# Patient Record
Sex: FEMALE | Race: WHITE | Employment: OTHER | URBAN - METROPOLITAN AREA
[De-identification: names, ages, dates, MRNs, and addresses within clinical notes are randomized per-mention and may not be internally consistent; named-entity substitution may affect disease eponyms.]

---

## 2017-09-15 ENCOUNTER — HOSPITAL ENCOUNTER (EMERGENCY)
Age: 77
Discharge: HOME | End: 2017-09-15
Attending: EMERGENCY MEDICINE
Payer: MEDICARE

## 2017-09-15 VITALS
HEART RATE: 89 BPM | OXYGEN SATURATION: 98 % | BODY MASS INDEX: 28.05 KG/M2 | RESPIRATION RATE: 18 BRPM | DIASTOLIC BLOOD PRESSURE: 69 MMHG | TEMPERATURE: 98.2 F | WEIGHT: 158.29 LBS | SYSTOLIC BLOOD PRESSURE: 131 MMHG | HEIGHT: 63 IN

## 2017-09-15 DIAGNOSIS — Z23 NEED FOR TETANUS BOOSTER: ICD-10-CM

## 2017-09-15 DIAGNOSIS — S05.01XA CORNEAL ABRASION, RIGHT, INITIAL ENCOUNTER: Primary | ICD-10-CM

## 2017-09-15 PROCEDURE — 74011250637 HC RX REV CODE- 250/637: Performed by: PHYSICIAN ASSISTANT

## 2017-09-15 PROCEDURE — 90471 IMMUNIZATION ADMIN: CPT

## 2017-09-15 PROCEDURE — 74011250636 HC RX REV CODE- 250/636: Performed by: PHYSICIAN ASSISTANT

## 2017-09-15 PROCEDURE — 74011000250 HC RX REV CODE- 250: Performed by: PHYSICIAN ASSISTANT

## 2017-09-15 PROCEDURE — 90715 TDAP VACCINE 7 YRS/> IM: CPT | Performed by: PHYSICIAN ASSISTANT

## 2017-09-15 PROCEDURE — 99283 EMERGENCY DEPT VISIT LOW MDM: CPT

## 2017-09-15 RX ORDER — OXYCODONE HYDROCHLORIDE 5 MG/1
5 TABLET ORAL
Status: COMPLETED | OUTPATIENT
Start: 2017-09-15 | End: 2017-09-15

## 2017-09-15 RX ORDER — ERYTHROMYCIN 5 MG/G
OINTMENT OPHTHALMIC
Qty: 3.5 G | Refills: 0 | Status: SHIPPED | OUTPATIENT
Start: 2017-09-15 | End: 2017-09-22

## 2017-09-15 RX ORDER — ERYTHROMYCIN 5 MG/G
OINTMENT OPHTHALMIC
Status: COMPLETED | OUTPATIENT
Start: 2017-09-15 | End: 2017-09-15

## 2017-09-15 RX ORDER — HYDROCODONE BITARTRATE AND ACETAMINOPHEN 5; 325 MG/1; MG/1
1 TABLET ORAL
Status: DISCONTINUED | OUTPATIENT
Start: 2017-09-15 | End: 2017-09-15

## 2017-09-15 RX ORDER — OXYCODONE HYDROCHLORIDE 5 MG/1
2.5-5 TABLET ORAL
Qty: 12 TAB | Refills: 0 | Status: SHIPPED | OUTPATIENT
Start: 2017-09-15 | End: 2018-03-05

## 2017-09-15 RX ORDER — NEBIVOLOL 5 MG/1
2.5 TABLET ORAL 2 TIMES DAILY
COMMUNITY

## 2017-09-15 RX ORDER — BUTALBITAL, ASPIRIN, AND CAFFEINE 325; 50; 40 MG/1; MG/1; MG/1
1 CAPSULE ORAL
Qty: 20 CAP | Refills: 0 | Status: SHIPPED | OUTPATIENT
Start: 2017-09-15 | End: 2017-12-14

## 2017-09-15 RX ORDER — TETRACAINE HYDROCHLORIDE 5 MG/ML
1 SOLUTION OPHTHALMIC
Status: COMPLETED | OUTPATIENT
Start: 2017-09-15 | End: 2017-09-15

## 2017-09-15 RX ADMIN — TETANUS TOXOID, REDUCED DIPHTHERIA TOXOID AND ACELLULAR PERTUSSIS VACCINE, ADSORBED 0.5 ML: 5; 2.5; 8; 8; 2.5 SUSPENSION INTRAMUSCULAR at 22:17

## 2017-09-15 RX ADMIN — TETRACAINE HYDROCHLORIDE 1 DROP: 5 SOLUTION OPHTHALMIC at 22:06

## 2017-09-15 RX ADMIN — ERYTHROMYCIN: 5 OINTMENT OPHTHALMIC at 22:17

## 2017-09-15 RX ADMIN — FLUORESCEIN SODIUM 1 STRIP: 1 STRIP OPHTHALMIC at 22:06

## 2017-09-15 RX ADMIN — OXYCODONE HYDROCHLORIDE 5 MG: 5 TABLET ORAL at 22:06
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
## 2017-09-16 NOTE — DISCHARGE INSTRUCTIONS
Corneal Scratches: Care Instructions  Your Care Instructions    The cornea is the clear surface that covers the front of the eye. When a speck of dirt, a wood chip, an insect, or another object flies into your eye, it can cause a painful scratch on the cornea. Wearing contact lenses too long or rubbing your eyes can also scratch the cornea. Small scratches usually heal in a day or two. Deeper scratches may take longer. If you have had a foreign object removed from your eye or you have a corneal scratch, you will need to watch for infection and vision problems while your eye heals. Follow-up care is a key part of your treatment and safety. Be sure to make and go to all appointments, and call your doctor if you are having problems. It's also a good idea to know your test results and keep a list of the medicines you take. How can you care for yourself at home? · The doctor probably used a medicine during your exam to numb your eye. When it wears off in 30 to 60 minutes, your eye pain may come back. Take pain medicines exactly as directed. ¨ If the doctor gave you a prescription medicine for pain, take it as prescribed. ¨ If you are not taking a prescription pain medicine, ask your doctor if you can take an over-the-counter medicine. ¨ Do not take two or more pain medicines at the same time unless the doctor told you to. Many pain medicines have acetaminophen, which is Tylenol. Too much acetaminophen (Tylenol) can be harmful. · Do not rub your injured eye. Rubbing can make it worse. · Use the prescribed eyedrops or ointment as directed. Be sure the dropper or bottle tip is clean. To put in eyedrops or ointment:  ¨ Tilt your head back, and pull your lower eyelid down with one finger. ¨ Drop or squirt the medicine inside the lower lid. ¨ Close your eye for 30 to 60 seconds to let the drops or ointment move around. ¨ Do not touch the ointment or dropper tip to your eyelashes or any other surface.   · Do not use your contact lens in your hurt eye until your doctor says you can. Also, do not wear eye makeup until your eye has healed. · Do not drive if you have blurred vision. · Bright light may hurt. Sunglasses can help. · To prevent eye injuries in the future, wear safety glasses or goggles when you work with machines or tools, mow the lawn, or ride a bike or motorcycle. When should you call for help? Call your doctor now or seek immediate medical care if:  · You have signs of an eye infection, such as:  ¨ Pus or thick discharge coming from the eye. ¨ Redness or swelling around the eye. ¨ A fever. · You have new or worse eye pain. · You have vision changes. · It feels like there is something in your eye. · Light hurts your eye. Watch closely for changes in your health, and be sure to contact your doctor if:  · You do not get better as expected. Where can you learn more? Go to http://victoriano-meka.info/. Enter Q213 in the search box to learn more about \"Corneal Scratches: Care Instructions. \"  Current as of: March 20, 2017  Content Version: 11.3  © 1915-0716 [employer]. Care instructions adapted under license by [employer] (which disclaims liability or warranty for this information). If you have questions about a medical condition or this instruction, always ask your healthcare professional. [place] [address] any warranty or liability for your use of this information. DTaP (Diphtheria, Tetanus, Pertussis) Vaccine: What You Need to Know  Why get vaccinated? Diphtheria, tetanus, and pertussis are serious diseases caused by bacteria. Diphtheria and pertussis are spread from person to person. Tetanus enters the body through cuts or wounds. DIPHTHERIA causes a thick covering in the back of the throat. · It can lead to breathing problems, paralysis, heart failure, and even death.   TETANUS (Lockjaw) causes painful tightening of the muscles, usually all over the body. · It can lead to \"locking\" of the jaw so the victim cannot open his mouth or swallow. Tetanus leads to death in up to 2 out of 10 cases. PERTUSSIS (Whooping Cough) causes coughing spells so bad that it is hard for infants to eat, drink, or breathe. These spells can last for weeks. · It can lead to pneumonia, seizures (jerking and staring spells), brain damage, and death. Diphtheria, tetanus, and pertussis vaccine (DTaP) can help prevent these diseases. Most children who are vaccinated with DTaP will be protected throughout childhood. Many more children would get these diseases if we stopped vaccinating. DTaP is a safer version of an older vaccine called DTP. DTP is no longer used in the [place]. Who should get DTaP vaccine and when? Children should get 5 doses of DTaP vaccine, one dose at each of the following ages:  · 2 months  · 4 months  · 6 months  · 15-18 months  · 4-6 years  DTaP may be given at the same time as other vaccines. Some children should not get DTaP vaccine or should wait. · Children with minor illnesses, such as a cold, may be vaccinated. But children who are moderately or severely ill should usually wait until they recover before getting DTaP vaccine. · Any child who had a life-threatening allergic reaction after a dose of DTaP should not get another dose. · Any child who suffered a brain or nervous system disease within 7 days after a dose of DTaP should not get another dose. · Talk with your doctor if your child:  Manuel Mora Had a seizure or collapsed after a dose of DTaP. ¨ Cried non-stop for 3 hours or more after a dose of DTaP. ¨ Had a fever over 105°F after a dose of DTaP. Ask your doctor for more information. Some of these children should not get another dose of pertussis vaccine, but may get a vaccine without pertussis, called DT. Older children and adults  DTaP is not licensed for adolescents, adults, or children 9years of age and older.   But older people still need protection. A vaccine called Tdap is similar to DTaP. A single dose of Tdap is recommended for people 11 through 59years of age. Another vaccine, called Td, protects against tetanus and diphtheria, but not pertussis. It is recommended every 10 years. There are separate Vaccine Information Statements for these vaccines. What are the risks from DTaP vaccine? Getting diphtheria, tetanus, or pertussis disease is much riskier than getting DTaP vaccine. However, a vaccine, like any medicine, is capable of causing serious problems, such as severe allergic reactions. The risk of DTaP vaccine causing serious harm, or death, is extremely small. Mild Problems (Common)  · Fever (up to about 1 child in 4)  · Redness or swelling where the shot was given (up to about 1 child in 4)  · Soreness or tenderness where the shot was given (up to about 1 child in 4)  These problems occur more often after the 4th and 5th doses of the DTaP series than after earlier doses. Sometimes the 4th or 5th dose of DTaP vaccine is followed by swelling of the entire arm or leg in which the shot was given, lasting 1-7 days (up to about 1 child in 27). Other mild problems include:  · Fussiness (up to about 1 child in 3)  · Tiredness or poor appetite (up to about 1 child in 10)  · Vomiting (up to about 1 child in 48)  These problems generally occur 1-3 days after the shot. Moderate Problems (Uncommon)  · Seizure (jerking or staring) (about 1 child out of 14,000)  · Non-stop crying, for 3 hours or more (up to about 1 child out of 1,000)  · High fever, over 105°F (about 1 child out of 16,000)  Severe Problems (Very Rare)  · Serious allergic reaction (less than 1 out of a million doses)  · Several other severe problems have been reported after DTaP vaccine. These include:  ¨ Long-term seizures, coma, or lowered consciousness. ¨ Permanent brain damage.   These are so rare it is hard to tell if they are caused by the vaccine. Controlling fever is especially important for children who have had seizures, for any reason. It is also important if another family member has had seizures. You can reduce fever and pain by giving your child an aspirin-free pain reliever when the shot is given, and for the next 24 hours, following the package instructions. What if there is a serious reaction? What should I look for? · Look for anything that concerns you, such as signs of a severe allergic reaction, very high fever, or behavior changes. Signs of a severe allergic reaction can include hives, swelling of the face and throat, difficulty breathing, a fast heartbeat, dizziness, and weakness. These would start a few minutes to a few hours after the vaccination. What should I do? · If you think it is a severe allergic reaction or other emergency that can't wait, call 9-1-1 or get the person to the nearest hospital. Otherwise, call your doctor. · Afterward, the reaction should be reported to the Vaccine Adverse Event Reporting System (VAERS). Your doctor might file this report, or you can do it yourself through the VAERS web site at www.vaers. hhs.gov, or by calling 6-790.209.1765. VAERS is only for reporting reactions. They do not give medical advice. The National Vaccine Injury Compensation Program  The National Vaccine Injury Compensation Program (VICP) is a federal program that was created to compensate people who may have been injured by certain vaccines. Persons who believe they may have been injured by a vaccine can learn about the program and about filing a claim by calling 9-450.453.8146 or visiting the [address] website at www.[facility] Services[facility].gov/vaccinecompensation. How can I learn more? · Ask your doctor. · Call your local or state health department.   · Contact the Centers for Disease Control and Prevention (CDC):  ¨ Call 4-977.907.6471 (1-800-CDC-INFO) or  ¨ Visit CDC's website at www.cdc.gov/vaccines  Vaccine Information Statement  DTaP (Tetanus, Diphtheria, Pertussis ) Vaccine  (5/17/2007)  [address] [id number]  Department of Health and Human Services  Centers for Disease Control and Prevention  Many Vaccine Information Statements are available in [place] and other languages. See www.immunize.org/vis. Muchas hojas de información sobre vacunas están disponibles en español y en otros idiomas. Visite www.immunize.org/vis. Care instructions adapted under license by [employer] (which disclaims liability or warranty for this information). If you have questions about a medical condition or this instruction, always ask your healthcare professional. [address] any warranty or liability for your use of this information.

## 2017-09-16 NOTE — ED NOTES
Patient stated that she had suddenly felt sharp pain in Right eye around 1600. Patient stated that she is in intense pain, especially when she attempts to close her eyes. Patient stated that she feels like there is something in her eyes. Patient stated that her daughter saw a \"bubble\" in her eye. Patient stated that she went to [facility] and that they told her that they \"dont do eyes. \"

## 2017-09-16 NOTE — ED NOTES
Patient discharge instructions reviewed with patient by MARCELLO Martinez. Patient denied questions. Patient ambulated off unit without assistance.

## 2018-03-05 ENCOUNTER — ANESTHESIA EVENT (OUTPATIENT)
Dept: SURGERY | Age: 78
End: 2018-03-05
Payer: MEDICARE

## 2018-03-05 ENCOUNTER — HOSPITAL ENCOUNTER (OUTPATIENT)
Age: 78
Setting detail: OBSERVATION
Discharge: HOME | End: 2018-03-06
Attending: EMERGENCY MEDICINE | Admitting: SURGERY
Payer: MEDICARE

## 2018-03-05 ENCOUNTER — ANESTHESIA (OUTPATIENT)
Dept: SURGERY | Age: 78
End: 2018-03-05
Payer: MEDICARE

## 2018-03-05 ENCOUNTER — APPOINTMENT (OUTPATIENT)
Dept: ULTRASOUND IMAGING | Age: 78
End: 2018-03-05
Attending: EMERGENCY MEDICINE
Payer: MEDICARE

## 2018-03-05 ENCOUNTER — APPOINTMENT (OUTPATIENT)
Dept: GENERAL RADIOLOGY | Age: 78
End: 2018-03-05
Attending: EMERGENCY MEDICINE
Payer: MEDICARE

## 2018-03-05 DIAGNOSIS — K80.00 ACUTE CALCULOUS CHOLECYSTITIS: ICD-10-CM

## 2018-03-05 DIAGNOSIS — K81.0 ACUTE CHOLECYSTITIS: Primary | ICD-10-CM

## 2018-03-05 PROBLEM — Z90.49 S/P CHOLECYSTECTOMY: Status: ACTIVE | Noted: 2018-03-05

## 2018-03-05 LAB
ALBUMIN SERPL-MCNC: 4 G/DL (ref 3.5–5)
ALBUMIN/GLOB SERPL: 1 {RATIO} (ref 1.1–2.2)
ALP SERPL-CCNC: 104 U/L (ref 45–117)
ALT SERPL-CCNC: 16 U/L (ref 12–78)
ANION GAP SERPL CALC-SCNC: 7 MMOL/L (ref 5–15)
APPEARANCE UR: CLEAR
AST SERPL-CCNC: 16 U/L (ref 15–37)
ATRIAL RATE: 61 BPM
BACTERIA URNS QL MICRO: NEGATIVE /HPF
BASOPHILS # BLD: 0 K/UL (ref 0–0.1)
BASOPHILS NFR BLD: 0 % (ref 0–1)
BILIRUB SERPL-MCNC: 0.5 MG/DL (ref 0.2–1)
BILIRUB UR QL: NEGATIVE
BUN SERPL-MCNC: 17 MG/DL (ref 6–20)
BUN/CREAT SERPL: 22 (ref 12–20)
CALCIUM SERPL-MCNC: 9 MG/DL (ref 8.5–10.1)
CALCULATED P AXIS, ECG09: 5 DEGREES
CALCULATED R AXIS, ECG10: -22 DEGREES
CALCULATED T AXIS, ECG11: -7 DEGREES
CHLORIDE SERPL-SCNC: 102 MMOL/L (ref 97–108)
CK MB CFR SERPL CALC: NORMAL % (ref 0–2.5)
CK MB SERPL-MCNC: <1 NG/ML (ref 5–25)
CK SERPL-CCNC: 53 U/L (ref 26–192)
CO2 SERPL-SCNC: 26 MMOL/L (ref 21–32)
COLOR UR: NORMAL
CREAT SERPL-MCNC: 0.79 MG/DL (ref 0.55–1.02)
DIAGNOSIS, 93000: NORMAL
DIFFERENTIAL METHOD BLD: ABNORMAL
EOSINOPHIL # BLD: 0.1 K/UL (ref 0–0.4)
EOSINOPHIL NFR BLD: 1 % (ref 0–7)
EPITH CASTS URNS QL MICRO: NORMAL /LPF
ERYTHROCYTE [DISTWIDTH] IN BLOOD BY AUTOMATED COUNT: 12.8 % (ref 11.5–14.5)
GLOBULIN SER CALC-MCNC: 3.9 G/DL (ref 2–4)
GLUCOSE SERPL-MCNC: 114 MG/DL (ref 65–100)
GLUCOSE UR STRIP.AUTO-MCNC: NEGATIVE MG/DL
HCT VFR BLD AUTO: 40.1 % (ref 35–47)
HGB BLD-MCNC: 13.5 G/DL (ref 11.5–16)
HGB UR QL STRIP: NEGATIVE
HYALINE CASTS URNS QL MICRO: NORMAL /LPF (ref 0–5)
IMM GRANULOCYTES # BLD: 0.1 K/UL (ref 0–0.04)
IMM GRANULOCYTES NFR BLD AUTO: 1 % (ref 0–0.5)
KETONES UR QL STRIP.AUTO: NEGATIVE MG/DL
LEUKOCYTE ESTERASE UR QL STRIP.AUTO: NEGATIVE
LIPASE SERPL-CCNC: 137 U/L (ref 73–393)
LYMPHOCYTES # BLD: 1.6 K/UL (ref 0.8–3.5)
LYMPHOCYTES NFR BLD: 13 % (ref 12–49)
MCH RBC QN AUTO: 31 PG (ref 26–34)
MCHC RBC AUTO-ENTMCNC: 33.7 G/DL (ref 30–36.5)
MCV RBC AUTO: 92 FL (ref 80–99)
MONOCYTES # BLD: 0.5 K/UL (ref 0–1)
MONOCYTES NFR BLD: 4 % (ref 5–13)
NEUTS SEG # BLD: 10.1 K/UL (ref 1.8–8)
NEUTS SEG NFR BLD: 81 % (ref 32–75)
NITRITE UR QL STRIP.AUTO: NEGATIVE
NRBC # BLD: 0 K/UL (ref 0–0.01)
NRBC BLD-RTO: 0 PER 100 WBC
P-R INTERVAL, ECG05: 154 MS
PH UR STRIP: 6.5 [PH] (ref 5–8)
PLATELET # BLD AUTO: 313 K/UL (ref 150–400)
PMV BLD AUTO: 9.9 FL (ref 8.9–12.9)
POTASSIUM SERPL-SCNC: 3.8 MMOL/L (ref 3.5–5.1)
PROT SERPL-MCNC: 7.9 G/DL (ref 6.4–8.2)
PROT UR STRIP-MCNC: NEGATIVE MG/DL
Q-T INTERVAL, ECG07: 404 MS
QRS DURATION, ECG06: 72 MS
QTC CALCULATION (BEZET), ECG08: 406 MS
RBC # BLD AUTO: 4.36 M/UL (ref 3.8–5.2)
RBC #/AREA URNS HPF: NORMAL /HPF (ref 0–5)
SODIUM SERPL-SCNC: 135 MMOL/L (ref 136–145)
SP GR UR REFRACTOMETRY: 1.01 (ref 1–1.03)
TROPONIN I SERPL-MCNC: <0.04 NG/ML
UA: UC IF INDICATED,UAUC: NORMAL
UROBILINOGEN UR QL STRIP.AUTO: 0.2 EU/DL (ref 0.2–1)
VENTRICULAR RATE, ECG03: 61 BPM
WBC # BLD AUTO: 12.4 K/UL (ref 3.6–11)
WBC URNS QL MICRO: NORMAL /HPF (ref 0–4)

## 2018-03-05 PROCEDURE — 77030008771 HC TU NG SALEM SUMP -A: Performed by: NURSE ANESTHETIST, CERTIFIED REGISTERED

## 2018-03-05 PROCEDURE — 93005 ELECTROCARDIOGRAM TRACING: CPT

## 2018-03-05 PROCEDURE — 77010033678 HC OXYGEN DAILY

## 2018-03-05 PROCEDURE — 71046 X-RAY EXAM CHEST 2 VIEWS: CPT

## 2018-03-05 PROCEDURE — 96375 TX/PRO/DX INJ NEW DRUG ADDON: CPT

## 2018-03-05 PROCEDURE — 77030031139 HC SUT VCRL2 J&J -A: Performed by: SURGERY

## 2018-03-05 PROCEDURE — 74011250636 HC RX REV CODE- 250/636

## 2018-03-05 PROCEDURE — 77030035220 HC TRCR ENDOSC BLNTPRT ANCHR COVD -B: Performed by: SURGERY

## 2018-03-05 PROCEDURE — 76210000035 HC AMBSU PH I REC 1 TO 1.5 HR: Performed by: SURGERY

## 2018-03-05 PROCEDURE — 74011250637 HC RX REV CODE- 250/637: Performed by: EMERGENCY MEDICINE

## 2018-03-05 PROCEDURE — 83690 ASSAY OF LIPASE: CPT | Performed by: EMERGENCY MEDICINE

## 2018-03-05 PROCEDURE — 74011000250 HC RX REV CODE- 250: Performed by: SURGERY

## 2018-03-05 PROCEDURE — 76705 ECHO EXAM OF ABDOMEN: CPT

## 2018-03-05 PROCEDURE — 77030019908 HC STETH ESOPH SIMS -A: Performed by: NURSE ANESTHETIST, CERTIFIED REGISTERED

## 2018-03-05 PROCEDURE — 81001 URINALYSIS AUTO W/SCOPE: CPT | Performed by: EMERGENCY MEDICINE

## 2018-03-05 PROCEDURE — 77030020053 HC ELECTRD LAPSCP COVD -B: Performed by: SURGERY

## 2018-03-05 PROCEDURE — 77030035048 HC TRCR ENDOSC OPTCL COVD -B: Performed by: SURGERY

## 2018-03-05 PROCEDURE — 99285 EMERGENCY DEPT VISIT HI MDM: CPT

## 2018-03-05 PROCEDURE — 74011000250 HC RX REV CODE- 250: Performed by: EMERGENCY MEDICINE

## 2018-03-05 PROCEDURE — 77030021678 HC GLIDESCP STAT DISP VERT -B: Performed by: NURSE ANESTHETIST, CERTIFIED REGISTERED

## 2018-03-05 PROCEDURE — 77030013079 HC BLNKT BAIR HGGR 3M -A: Performed by: NURSE ANESTHETIST, CERTIFIED REGISTERED

## 2018-03-05 PROCEDURE — 84484 ASSAY OF TROPONIN QUANT: CPT | Performed by: EMERGENCY MEDICINE

## 2018-03-05 PROCEDURE — 77030035051: Performed by: SURGERY

## 2018-03-05 PROCEDURE — 96365 THER/PROPH/DIAG IV INF INIT: CPT

## 2018-03-05 PROCEDURE — 74011250636 HC RX REV CODE- 250/636: Performed by: SURGERY

## 2018-03-05 PROCEDURE — 76030000000 HC AMB SURG OR TIME 0.5 TO 1: Performed by: SURGERY

## 2018-03-05 PROCEDURE — 77030010507 HC ADH SKN DERMBND J&J -B: Performed by: SURGERY

## 2018-03-05 PROCEDURE — 99218 HC RM OBSERVATION: CPT

## 2018-03-05 PROCEDURE — 77030021352 HC CBL LD SYS DISP COVD -B: Performed by: SURGERY

## 2018-03-05 PROCEDURE — 77030018836 HC SOL IRR NACL ICUM -A: Performed by: SURGERY

## 2018-03-05 PROCEDURE — 77030020255 HC SOL INJ LR 1000ML BG: Performed by: SURGERY

## 2018-03-05 PROCEDURE — 85025 COMPLETE CBC W/AUTO DIFF WBC: CPT | Performed by: EMERGENCY MEDICINE

## 2018-03-05 PROCEDURE — 74011250637 HC RX REV CODE- 250/637: Performed by: SURGERY

## 2018-03-05 PROCEDURE — 77030012022 HC APPL CLP ENDOSC COVD -C: Performed by: SURGERY

## 2018-03-05 PROCEDURE — 80053 COMPREHEN METABOLIC PANEL: CPT | Performed by: EMERGENCY MEDICINE

## 2018-03-05 PROCEDURE — 77030026438 HC STYL ET INTUB CARD -A: Performed by: NURSE ANESTHETIST, CERTIFIED REGISTERED

## 2018-03-05 PROCEDURE — 77030002933 HC SUT MCRYL J&J -A: Performed by: SURGERY

## 2018-03-05 PROCEDURE — 74011250636 HC RX REV CODE- 250/636: Performed by: EMERGENCY MEDICINE

## 2018-03-05 PROCEDURE — 74011000250 HC RX REV CODE- 250

## 2018-03-05 PROCEDURE — 77030011640 HC PAD GRND REM COVD -A: Performed by: SURGERY

## 2018-03-05 PROCEDURE — 77030009852 HC PCH RTVR ENDOSC COVD -B: Performed by: SURGERY

## 2018-03-05 PROCEDURE — 77030008684 HC TU ET CUF COVD -B: Performed by: NURSE ANESTHETIST, CERTIFIED REGISTERED

## 2018-03-05 PROCEDURE — 88304 TISSUE EXAM BY PATHOLOGIST: CPT | Performed by: SURGERY

## 2018-03-05 PROCEDURE — 74011250636 HC RX REV CODE- 250/636: Performed by: ANESTHESIOLOGY

## 2018-03-05 PROCEDURE — 96361 HYDRATE IV INFUSION ADD-ON: CPT

## 2018-03-05 PROCEDURE — 77030008756 HC TU IRR SUC STRY -B: Performed by: SURGERY

## 2018-03-05 PROCEDURE — 76060000061 HC AMB SURG ANES 0.5 TO 1 HR: Performed by: SURGERY

## 2018-03-05 PROCEDURE — 36415 COLL VENOUS BLD VENIPUNCTURE: CPT | Performed by: EMERGENCY MEDICINE

## 2018-03-05 PROCEDURE — 82550 ASSAY OF CK (CPK): CPT | Performed by: EMERGENCY MEDICINE

## 2018-03-05 RX ORDER — ONDANSETRON 2 MG/ML
INJECTION INTRAMUSCULAR; INTRAVENOUS AS NEEDED
Status: DISCONTINUED | OUTPATIENT
Start: 2018-03-05 | End: 2018-03-05 | Stop reason: HOSPADM

## 2018-03-05 RX ORDER — ALBUTEROL SULFATE 2.5 MG/.5ML
5 SOLUTION RESPIRATORY (INHALATION)
Status: DISCONTINUED | OUTPATIENT
Start: 2018-03-05 | End: 2018-03-05

## 2018-03-05 RX ORDER — HYDROMORPHONE HYDROCHLORIDE 1 MG/ML
0.5 INJECTION, SOLUTION INTRAMUSCULAR; INTRAVENOUS; SUBCUTANEOUS
Status: DISCONTINUED | OUTPATIENT
Start: 2018-03-05 | End: 2018-03-06 | Stop reason: HOSPADM

## 2018-03-05 RX ORDER — SODIUM CHLORIDE, SODIUM LACTATE, POTASSIUM CHLORIDE, CALCIUM CHLORIDE 600; 310; 30; 20 MG/100ML; MG/100ML; MG/100ML; MG/100ML
25 INJECTION, SOLUTION INTRAVENOUS CONTINUOUS
Status: DISCONTINUED | OUTPATIENT
Start: 2018-03-05 | End: 2018-03-05 | Stop reason: HOSPADM

## 2018-03-05 RX ORDER — LIDOCAINE HYDROCHLORIDE 20 MG/ML
INJECTION, SOLUTION EPIDURAL; INFILTRATION; INTRACAUDAL; PERINEURAL AS NEEDED
Status: DISCONTINUED | OUTPATIENT
Start: 2018-03-05 | End: 2018-03-05 | Stop reason: HOSPADM

## 2018-03-05 RX ORDER — SODIUM CHLORIDE 0.9 % (FLUSH) 0.9 %
5-10 SYRINGE (ML) INJECTION EVERY 8 HOURS
Status: DISCONTINUED | OUTPATIENT
Start: 2018-03-05 | End: 2018-03-05

## 2018-03-05 RX ORDER — HYDROMORPHONE HYDROCHLORIDE 1 MG/ML
.2-.5 INJECTION, SOLUTION INTRAMUSCULAR; INTRAVENOUS; SUBCUTANEOUS ONCE
Status: DISCONTINUED | OUTPATIENT
Start: 2018-03-05 | End: 2018-03-05 | Stop reason: HOSPADM

## 2018-03-05 RX ORDER — SODIUM CHLORIDE 0.9 % (FLUSH) 0.9 %
5-10 SYRINGE (ML) INJECTION AS NEEDED
Status: DISCONTINUED | OUTPATIENT
Start: 2018-03-05 | End: 2018-03-05 | Stop reason: HOSPADM

## 2018-03-05 RX ORDER — ROCURONIUM BROMIDE 10 MG/ML
INJECTION, SOLUTION INTRAVENOUS AS NEEDED
Status: DISCONTINUED | OUTPATIENT
Start: 2018-03-05 | End: 2018-03-05 | Stop reason: HOSPADM

## 2018-03-05 RX ORDER — OXYCODONE HYDROCHLORIDE 5 MG/1
5 TABLET ORAL
Status: DISCONTINUED | OUTPATIENT
Start: 2018-03-05 | End: 2018-03-05 | Stop reason: HOSPADM

## 2018-03-05 RX ORDER — NEBIVOLOL 2.5 MG/1
2.5 TABLET ORAL 2 TIMES DAILY
Status: DISCONTINUED | OUTPATIENT
Start: 2018-03-05 | End: 2018-03-06 | Stop reason: HOSPADM

## 2018-03-05 RX ORDER — ALPRAZOLAM 1 MG/1
1 TABLET ORAL
COMMUNITY

## 2018-03-05 RX ORDER — SUCCINYLCHOLINE CHLORIDE 20 MG/ML
INJECTION INTRAMUSCULAR; INTRAVENOUS AS NEEDED
Status: DISCONTINUED | OUTPATIENT
Start: 2018-03-05 | End: 2018-03-05 | Stop reason: HOSPADM

## 2018-03-05 RX ORDER — ALPRAZOLAM 0.5 MG/1
1 TABLET ORAL
Status: DISCONTINUED | OUTPATIENT
Start: 2018-03-05 | End: 2018-03-06 | Stop reason: HOSPADM

## 2018-03-05 RX ORDER — BUPIVACAINE HYDROCHLORIDE 5 MG/ML
INJECTION, SOLUTION EPIDURAL; INTRACAUDAL AS NEEDED
Status: DISCONTINUED | OUTPATIENT
Start: 2018-03-05 | End: 2018-03-05 | Stop reason: HOSPADM

## 2018-03-05 RX ORDER — ONDANSETRON 2 MG/ML
4 INJECTION INTRAMUSCULAR; INTRAVENOUS
Status: DISCONTINUED | OUTPATIENT
Start: 2018-03-05 | End: 2018-03-06 | Stop reason: HOSPADM

## 2018-03-05 RX ORDER — OXYCODONE HYDROCHLORIDE 5 MG/1
5 TABLET ORAL
Status: DISCONTINUED | OUTPATIENT
Start: 2018-03-05 | End: 2018-03-06 | Stop reason: HOSPADM

## 2018-03-05 RX ORDER — LIDOCAINE HYDROCHLORIDE 10 MG/ML
0.1 INJECTION, SOLUTION EPIDURAL; INFILTRATION; INTRACAUDAL; PERINEURAL AS NEEDED
Status: DISCONTINUED | OUTPATIENT
Start: 2018-03-05 | End: 2018-03-05 | Stop reason: HOSPADM

## 2018-03-05 RX ORDER — DEXTROSE, SODIUM CHLORIDE, AND POTASSIUM CHLORIDE 5; .45; .15 G/100ML; G/100ML; G/100ML
75 INJECTION INTRAVENOUS CONTINUOUS
Status: DISCONTINUED | OUTPATIENT
Start: 2018-03-05 | End: 2018-03-05 | Stop reason: HOSPADM

## 2018-03-05 RX ORDER — HYDROMORPHONE HYDROCHLORIDE 2 MG/ML
INJECTION, SOLUTION INTRAMUSCULAR; INTRAVENOUS; SUBCUTANEOUS AS NEEDED
Status: DISCONTINUED | OUTPATIENT
Start: 2018-03-05 | End: 2018-03-05 | Stop reason: HOSPADM

## 2018-03-05 RX ORDER — FENTANYL CITRATE 50 UG/ML
INJECTION, SOLUTION INTRAMUSCULAR; INTRAVENOUS AS NEEDED
Status: DISCONTINUED | OUTPATIENT
Start: 2018-03-05 | End: 2018-03-05 | Stop reason: HOSPADM

## 2018-03-05 RX ORDER — MIDAZOLAM HYDROCHLORIDE 1 MG/ML
INJECTION, SOLUTION INTRAMUSCULAR; INTRAVENOUS AS NEEDED
Status: DISCONTINUED | OUTPATIENT
Start: 2018-03-05 | End: 2018-03-05 | Stop reason: HOSPADM

## 2018-03-05 RX ORDER — FENTANYL CITRATE 50 UG/ML
25 INJECTION, SOLUTION INTRAMUSCULAR; INTRAVENOUS
Status: DISCONTINUED | OUTPATIENT
Start: 2018-03-05 | End: 2018-03-05 | Stop reason: HOSPADM

## 2018-03-05 RX ORDER — DEXAMETHASONE SODIUM PHOSPHATE 4 MG/ML
INJECTION, SOLUTION INTRA-ARTICULAR; INTRALESIONAL; INTRAMUSCULAR; INTRAVENOUS; SOFT TISSUE AS NEEDED
Status: DISCONTINUED | OUTPATIENT
Start: 2018-03-05 | End: 2018-03-05 | Stop reason: HOSPADM

## 2018-03-05 RX ORDER — SODIUM CHLORIDE 9 MG/ML
125 INJECTION, SOLUTION INTRAVENOUS CONTINUOUS
Status: DISCONTINUED | OUTPATIENT
Start: 2018-03-05 | End: 2018-03-05

## 2018-03-05 RX ORDER — PHENYLEPHRINE HCL IN 0.9% NACL 0.4MG/10ML
SYRINGE (ML) INTRAVENOUS AS NEEDED
Status: DISCONTINUED | OUTPATIENT
Start: 2018-03-05 | End: 2018-03-05 | Stop reason: HOSPADM

## 2018-03-05 RX ORDER — PROPOFOL 10 MG/ML
INJECTION, EMULSION INTRAVENOUS AS NEEDED
Status: DISCONTINUED | OUTPATIENT
Start: 2018-03-05 | End: 2018-03-05 | Stop reason: HOSPADM

## 2018-03-05 RX ORDER — HYDROMORPHONE HYDROCHLORIDE 1 MG/ML
0.5 INJECTION, SOLUTION INTRAMUSCULAR; INTRAVENOUS; SUBCUTANEOUS
Status: DISCONTINUED | OUTPATIENT
Start: 2018-03-05 | End: 2018-03-05 | Stop reason: HOSPADM

## 2018-03-05 RX ORDER — SODIUM CHLORIDE 0.9 % (FLUSH) 0.9 %
5-10 SYRINGE (ML) INJECTION EVERY 8 HOURS
Status: DISCONTINUED | OUTPATIENT
Start: 2018-03-05 | End: 2018-03-05 | Stop reason: HOSPADM

## 2018-03-05 RX ORDER — IBUPROFEN 200 MG
200-400 TABLET ORAL
COMMUNITY

## 2018-03-05 RX ORDER — DEXTROSE, SODIUM CHLORIDE, AND POTASSIUM CHLORIDE 5; .45; .15 G/100ML; G/100ML; G/100ML
75 INJECTION INTRAVENOUS CONTINUOUS
Status: DISCONTINUED | OUTPATIENT
Start: 2018-03-05 | End: 2018-03-06 | Stop reason: HOSPADM

## 2018-03-05 RX ORDER — KETOROLAC TROMETHAMINE 30 MG/ML
15 INJECTION, SOLUTION INTRAMUSCULAR; INTRAVENOUS
Status: COMPLETED | OUTPATIENT
Start: 2018-03-05 | End: 2018-03-05

## 2018-03-05 RX ORDER — EPHEDRINE SULFATE 50 MG/ML
INJECTION, SOLUTION INTRAVENOUS AS NEEDED
Status: DISCONTINUED | OUTPATIENT
Start: 2018-03-05 | End: 2018-03-05 | Stop reason: HOSPADM

## 2018-03-05 RX ORDER — DIPHENHYDRAMINE HYDROCHLORIDE 50 MG/ML
12.5 INJECTION, SOLUTION INTRAMUSCULAR; INTRAVENOUS AS NEEDED
Status: DISCONTINUED | OUTPATIENT
Start: 2018-03-05 | End: 2018-03-05 | Stop reason: HOSPADM

## 2018-03-05 RX ORDER — ONDANSETRON 2 MG/ML
4 INJECTION INTRAMUSCULAR; INTRAVENOUS
Status: COMPLETED | OUTPATIENT
Start: 2018-03-05 | End: 2018-03-05

## 2018-03-05 RX ORDER — MORPHINE SULFATE 10 MG/ML
2 INJECTION, SOLUTION INTRAMUSCULAR; INTRAVENOUS
Status: DISCONTINUED | OUTPATIENT
Start: 2018-03-05 | End: 2018-03-05 | Stop reason: HOSPADM

## 2018-03-05 RX ORDER — SODIUM CHLORIDE 0.9 % (FLUSH) 0.9 %
5-10 SYRINGE (ML) INJECTION AS NEEDED
Status: DISCONTINUED | OUTPATIENT
Start: 2018-03-05 | End: 2018-03-05

## 2018-03-05 RX ADMIN — SODIUM CHLORIDE, POTASSIUM CHLORIDE, SODIUM LACTATE AND CALCIUM CHLORIDE: 600; 310; 30; 20 INJECTION, SOLUTION INTRAVENOUS at 12:51

## 2018-03-05 RX ADMIN — FENTANYL CITRATE 100 MCG: 50 INJECTION, SOLUTION INTRAMUSCULAR; INTRAVENOUS at 13:01

## 2018-03-05 RX ADMIN — ROCURONIUM BROMIDE 25 MG: 10 INJECTION, SOLUTION INTRAVENOUS at 13:21

## 2018-03-05 RX ADMIN — Medication 120 MCG: at 13:16

## 2018-03-05 RX ADMIN — SODIUM CHLORIDE 500 ML: 900 INJECTION, SOLUTION INTRAVENOUS at 07:08

## 2018-03-05 RX ADMIN — DEXTROSE MONOHYDRATE, SODIUM CHLORIDE, AND POTASSIUM CHLORIDE 75 ML/HR: 50; 4.5; 1.49 INJECTION, SOLUTION INTRAVENOUS at 18:15

## 2018-03-05 RX ADMIN — LIDOCAINE HYDROCHLORIDE 40 ML: 20 SOLUTION ORAL; TOPICAL at 07:08

## 2018-03-05 RX ADMIN — DEXAMETHASONE SODIUM PHOSPHATE 4 MG: 4 INJECTION, SOLUTION INTRA-ARTICULAR; INTRALESIONAL; INTRAMUSCULAR; INTRAVENOUS; SOFT TISSUE at 13:23

## 2018-03-05 RX ADMIN — ROCURONIUM BROMIDE 5 MG: 10 INJECTION, SOLUTION INTRAVENOUS at 13:06

## 2018-03-05 RX ADMIN — LIDOCAINE HYDROCHLORIDE 100 MG: 20 INJECTION, SOLUTION EPIDURAL; INFILTRATION; INTRACAUDAL; PERINEURAL at 13:06

## 2018-03-05 RX ADMIN — ALPRAZOLAM 1 MG: 0.5 TABLET ORAL at 22:35

## 2018-03-05 RX ADMIN — MIDAZOLAM HYDROCHLORIDE 2 MG: 1 INJECTION, SOLUTION INTRAMUSCULAR; INTRAVENOUS at 12:56

## 2018-03-05 RX ADMIN — PIPERACILLIN SODIUM AND TAZOBACTAM SODIUM 3.38 G: .375; 3 INJECTION, POWDER, LYOPHILIZED, FOR SOLUTION INTRAVENOUS at 10:53

## 2018-03-05 RX ADMIN — ONDANSETRON 4 MG: 2 INJECTION INTRAMUSCULAR; INTRAVENOUS at 07:08

## 2018-03-05 RX ADMIN — PROPOFOL 130 MG: 10 INJECTION, EMULSION INTRAVENOUS at 13:06

## 2018-03-05 RX ADMIN — ONDANSETRON 4 MG: 2 INJECTION INTRAMUSCULAR; INTRAVENOUS at 13:23

## 2018-03-05 RX ADMIN — SUCCINYLCHOLINE CHLORIDE 120 MG: 20 INJECTION INTRAMUSCULAR; INTRAVENOUS at 13:06

## 2018-03-05 RX ADMIN — HYDROMORPHONE HYDROCHLORIDE 0.5 MG: 2 INJECTION, SOLUTION INTRAMUSCULAR; INTRAVENOUS; SUBCUTANEOUS at 13:57

## 2018-03-05 RX ADMIN — EPHEDRINE SULFATE 20 MG: 50 INJECTION, SOLUTION INTRAVENOUS at 13:20

## 2018-03-05 RX ADMIN — HYDROMORPHONE HYDROCHLORIDE 0.5 MG: 2 INJECTION, SOLUTION INTRAMUSCULAR; INTRAVENOUS; SUBCUTANEOUS at 13:29

## 2018-03-05 RX ADMIN — HYDROMORPHONE HYDROCHLORIDE 0.5 MG: 1 INJECTION, SOLUTION INTRAMUSCULAR; INTRAVENOUS; SUBCUTANEOUS at 18:16

## 2018-03-05 RX ADMIN — KETOROLAC TROMETHAMINE 15 MG: 30 INJECTION, SOLUTION INTRAMUSCULAR at 08:26

## 2018-03-05 RX ADMIN — Medication 10 ML: at 14:57

## 2018-03-05 NOTE — ED TRIAGE NOTES
The patient arrives to triage, ambulatory with a steady gait. The patient reports epigastric pain with nausea and \"spitting up\". Reports HTN at home with personal meter in the 170's. Denies dizziness, blurry vision, headache, shortness of breath or chest pain.

## 2018-03-05 NOTE — PROGRESS NOTES
Pharmacy Medication Reconciliation     The patient was interviewed regarding current PTA medication list, use and drug allergies;  patient only was present in room and obtained permission from patient to discuss drug regimen with visitor(s) present. The patient was questioned regarding use of any other inhalers, topical products, over the counter medications, herbal medications, vitamin products or ophthalmic/nasal/otic medication use. Allergy Update: None new  -acetaminophen: unknown  -oxycodone: vomiting    Recommendations/Findings: The following amendments were made to the patient's active medication list on file at [address]:   1) Additions:   -alprazolam 1mg PO BiD PRN: patient sometimes splits evening dose and takes 0.5mg for sleep with an additional 0.5mg if she awakes during the evening.  -ibuprofen 200-400mg PO q 8hr PRN pain    2) Deletions:   -oxycodone: NOT TAKING    3) Changes:   (Old regimen: nebivolol 2.5mg PO daily /New regimen: nebivolol 2.5mg PO BiD)--  --patient was instructed to increase dose in the past week to 5mg PO daily by her MD, however she develops cough when taking that dose, so she splits dose and takes 2.5mg PO BiD.         -Clarified PTA med list with patient interview and Rx Surescripts query. PTA medication list was corrected to the following:     Prior to Admission Medications   Prescriptions Last Dose Informant Patient Reported? Taking? ALPRAZolam (XANAX) 1 mg tablet 3/4/2018 at 2100  Yes Yes   Sig: Take 1 mg by mouth two (2) times daily as needed for Anxiety or Sleep (For Nighttime dose: patient sometimes takes 0.5mg for sleep and an additional 0.5mg if she awakes during overnight. ).   ibuprofen (ADVIL) 200 mg tablet 2/5/2018 at Unknown time  Yes Yes   Sig: Take 200-400 mg by mouth every eight (8) hours as needed for Pain. nebivolol (BYSTOLIC) 5 mg tablet 1/3/7273 at 0200  Yes Yes   Sig: Take 2.5 mg by mouth two (2) times a day.  Indications: Patient takes 2.5mg (half pill) PO twice daily.  splits up dose due to cough      Facility-Administered Medications: None          Thank you,  Severiano Cornish, [facility]

## 2018-03-05 NOTE — ED NOTES
TRANSFER - OUT REPORT:    Bedside/Verbal report given to Pre-OP (name) on Seferino Whitaker  being transferred to Pre-Op(unit) for ordered procedure       Report consisted of patients Situation, Background, Assessment and   Recommendations(SBAR). Information from the following report(s) SBAR, Kardex, ED Summary, Intake/Output, MAR, Recent Results and Med Rec Status was reviewed with the receiving nurse. Lines:   Peripheral IV 03/05/18 Right Antecubital (Active)   Site Assessment Clean, dry, & intact 3/5/2018  6:41 AM   Phlebitis Assessment 0 3/5/2018  6:41 AM   Infiltration Assessment 0 3/5/2018  6:41 AM   Dressing Status Clean, dry, & intact 3/5/2018  6:41 AM   Dressing Type Transparent 3/5/2018  6:41 AM   Hub Color/Line Status Pink;Flushed 3/5/2018  6:41 AM   Action Taken Blood drawn 3/5/2018  6:41 AM        Opportunity for questions and clarification was provided. Patient transported with:   Registered Nurses   Patient's belongings (clothing, socks, shoes, and black purse).

## 2018-03-05 NOTE — PERIOP NOTES
Blanca Drop  1940  [id number]    Situation:  Verbal report given from: BERENICE Andrade CRNA  Procedure: Procedure(s):  CHOLECYSTECTOMY LAPAROSCOPIC    Background:    Preoperative diagnosis: Cholecystitis    Postoperative diagnosis: Cholecystitis    :  Dr. Grzegorz An    Assistant(s): Circ-1: Janessa Topete RN  Scrub Tech-1: Katie Borrego  Surg Asst-1: Nathan Smoker    Specimens:   ID Type Source Tests Collected by Time Destination   1 : Gallbladder Preservative Gallbladder  Dina Santoyo MD 3/5/2018 1334 Pathology       Assessment:  Intra-procedure medications         Anesthesia gave intra-procedure sedation and medications, see anesthesia flow sheet     Intravenous fluids: LR@ KVO     Vital signs stable       Recommendation:    Permission to share finding with dtr : yes

## 2018-03-05 NOTE — PROGRESS NOTES
CM completed room visit with pt. Pt aware of OBSERVATION/MOON letter (signed) placed in chart. Pt will have transportation and family support at home. Pt aware that her nurse will review d/c plans.     GRACE Benitez CM  [phone]

## 2018-03-05 NOTE — PERIOP NOTES
TRANSFER - OUT REPORT:    Verbal report given to Jayant Wei RN on Reida Nils  being transferred to Room 2134 (gen surg unit) for routine post - op       Report consisted of patients Situation, Background, Assessment and   Recommendations(SBAR). Information from the following report(s) SBAR, OR Summary, Intake/Output and MAR was reviewed with the receiving nurse. Opportunity for questions and clarification was provided.       Patient transported with:   Registered Nurse x2  Belonging bag transferred with pt

## 2018-03-05 NOTE — ANESTHESIA POSTPROCEDURE EVALUATION
Post-Anesthesia Evaluation and Assessment    Patient: Katie Krause MRN: [id number]  SSN: [id number]    YOB: 1940  Age: 69 y.o. Sex: female       Cardiovascular Function/Vital Signs  Visit Vitals    /50    Pulse 68    Temp 36.7 °C (98 °F)    Resp 16    Ht 5' 4\" (1.626 m)    Wt 74.7 kg (164 lb 10.9 oz)    SpO2 100%    BMI 28.27 kg/m2       Patient is status post general anesthesia for Procedure(s):  CHOLECYSTECTOMY LAPAROSCOPIC. Nausea/Vomiting: None    Postoperative hydration reviewed and adequate. Pain:  Pain Scale 1: Numeric (0 - 10) (03/05/18 1450)  Pain Intensity 1: 0 (03/05/18 1450)   Managed    Neurological Status:   Neuro (WDL): Within Defined Limits (03/05/18 1450)  Neuro  Neurologic State: Alert (03/05/18 1450)  LUE Motor Response: Spontaneous  (03/05/18 1401)  LLE Motor Response: Spontaneous  (03/05/18 1401)  RUE Motor Response: Spontaneous  (03/05/18 1401)  RLE Motor Response: Spontaneous  (03/05/18 1401)   At baseline    Mental Status and Level of Consciousness: Arousable    Pulmonary Status:   O2 Device: Nasal cannula (03/05/18 1450)   Adequate oxygenation and airway patent    Complications related to anesthesia: None    Post-anesthesia assessment completed. No concerns. Inpatient.     Signed By: Caridad Krause MD     March 5, 2018

## 2018-03-05 NOTE — ED NOTES
Assumed care of patient from Jackie Salazar RN. Patient resting comfortably on stretcher with call bell within reach. Patient currently speaking with pharmacy who is at bedside. No further complaints noted.
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
## 2018-03-05 NOTE — ANESTHESIA PREPROCEDURE EVALUATION
Anesthetic History     PONV          Review of Systems / Medical History  Patient summary reviewed, nursing notes reviewed and pertinent labs reviewed    Pulmonary  Within defined limits                 Neuro/Psych         Headaches and psychiatric history (anxiety)     Cardiovascular    Hypertension              Exercise tolerance: >4 METS     GI/Hepatic/Renal  Within defined limits              Endo/Other  Within defined limits           Other Findings              Physical Exam    Airway  Mallampati: I  TM Distance: < 4 cm  Neck ROM: decreased range of motion   Mouth opening: Normal     Cardiovascular    Rhythm: regular  Rate: normal      Pertinent negatives: No murmur   Dental  No notable dental hx       Pulmonary  Breath sounds clear to auscultation               Abdominal  GI exam deferred       Other Findings            Anesthetic Plan    ASA: 2  Anesthesia type: general    Monitoring Plan: BIS      Induction: Intravenous  Anesthetic plan and risks discussed with: Patient      PONV prophylaxis. Pt states that scopolamine patches \"make me sick\".  Took BB at 2 am

## 2018-03-05 NOTE — PERIOP NOTES
1401- Pt received to pacu. Pt waking and denies any complaints. VSS on O2 at 2L NC. Abd has 3 lap sites well approx with dermabond. 1406-Pt more awake, anxious and \"scared\". Holding pts hand and reassuring her that she is save. She keeps saying \"I dont want to be alone, dont leave me\". Gave pt reassurance and comfort. Pt is very sweet and cooperative. 1420-Gave pt warm blankets, pt shivering. VSS. 1430-Pt states she is less anxious, denies any complaints. Daughter is not in waiting room. 1440-Pt meets criteria to transfer to floor. VSS. Pt awake and alert, denies complaints. Pt is less anxious. 1450-Report called to [facility]  1500-Attempted to flush Rt AC IV, difficult to flush/positional. IV removed, no infiltration. 1510-Pt transferred up to Room 2134 with belonging bag.  1520-Pt dangled on side of bed and then stood/pivot to bed. Gave pillow for abd splinting. Pt has call bell & room phone. Instructed pt to call for assistance in using the bathroom. 1525-Pt is positioned for comfort.  Nurse is aware of pt arrival.

## 2018-03-05 NOTE — ED NOTES
Ultrasound called. RN was told there is no room available at this time. Was told to call back in about 20 minutes.

## 2018-03-05 NOTE — ED NOTES
Patient's belongings placed in a belonging bag. Consent obtained. Patient provided with CHG wipes and instructed on use. Patient also provided with a new gown for increased comfort. No further complaints noted. Patient reports having no other questions. Patient has been NPO since 0200. No further complaints noted.

## 2018-03-05 NOTE — IP AVS SNAPSHOT
[address] [facility] [address]. 
[phone] Patient: Milton Haskins MRN: [id number] PFP:5/[birth year]/4610 A check michael indicates which time of day the medication should be taken. My Medications START taking these medications Instructions Each Dose to Equal  
 Morning Noon Evening Bedtime  
 traMADol 50 mg tablet Commonly known as:  ULTRAM  
   
Your last dose was: Your next dose is: Take 1 Tab by mouth every six (6) hours as needed for Pain. Max Daily Amount: 200 mg.  
 50 mg CONTINUE taking these medications Instructions Each Dose to Equal  
 Morning Noon Evening Bedtime ADVIL 200 mg tablet Generic drug:  ibuprofen Your last dose was: Your next dose is: Take 200-400 mg by mouth every eight (8) hours as needed for Pain. 200-400 mg  
    
   
   
   
  
 ALPRAZolam 1 mg tablet Commonly known as:  Gal Bright Your last dose was: Your next dose is: Take 1 mg by mouth two (2) times daily as needed for Anxiety or Sleep (For Nighttime dose: patient sometimes takes 0.5mg for sleep and an additional 0.5mg if she awakes during overnight. ). 1 mg  
    
   
   
   
  
 nebivolol 5 mg tablet Commonly known as:  BYSTOLIC Your last dose was: Your next dose is: Take 2.5 mg by mouth two (2) times a day. Indications: Patient takes 2.5mg (half pill) PO twice daily. splits up dose due to cough 2.5 mg Where to Get Your Medications Information on where to get these meds will be given to you by the nurse or doctor. ! Ask your nurse or doctor about these medications  
  traMADol 50 mg tablet

## 2018-03-05 NOTE — ED NOTES
Bedside shift change report given to Azul (oncoming nurse) by [facility] (offgoing nurse). Report included the following information SBAR, Kardex, ED Summary and MAR.

## 2018-03-05 NOTE — ED NOTES
Bedside shift change report given to Nanette Machado (oncoming nurse) by Zainab Bustamante (offgoing nurse). Report included the following information SBAR, Kardex, ED Summary and MAR.

## 2018-03-05 NOTE — IP AVS SNAPSHOT
[address] [facility] 
[phone] Patient: Shefali Mann MRN: [id number] WEA:[birth year] About your hospitalization You were admitted on:  March 5, 2018 You last received care in the:  [facility] 2 GENERAL SURGERY You were discharged on:  March 6, 2018 Why you were hospitalized Your primary diagnosis was:  Acute Calculous Cholecystitis Your diagnoses also included:  S/P Cholecystectomy Follow-up Information Follow up With Details Comments Contact Info Bryon Ashraf MD   [address] [place] [address] [phone] [phone] 
[phone] Diane Meeks MD Schedule an appointment as soon as possible for a visit in 2 weeks For wound re-check post op  [address] [facility] 
[phone] Discharge Orders None A check michael indicates which time of day the medication should be taken. My Medications START taking these medications Instructions Each Dose to Equal  
 Morning Noon Evening Bedtime  
 traMADol 50 mg tablet Commonly known as:  ULTRAM  
   
Your last dose was: Your next dose is: Take 1 Tab by mouth every six (6) hours as needed for Pain. Max Daily Amount: 200 mg.  
 50 mg CONTINUE taking these medications Instructions Each Dose to Equal  
 Morning Noon Evening Bedtime ADVIL 200 mg tablet Generic drug:  ibuprofen Your last dose was: Your next dose is: Take 200-400 mg by mouth every eight (8) hours as needed for Pain. 200-400 mg  
    
   
   
   
  
 ALPRAZolam 1 mg tablet Commonly known as:  Curt Flaca Your last dose was: Your next dose is: Take 1 mg by mouth two (2) times daily as needed for Anxiety or Sleep (For Nighttime dose: patient sometimes takes 0.5mg for sleep and an additional 0.5mg if she awakes during overnight. ). 1 mg nebivolol 5 mg tablet Commonly known as:  BYSTOLIC Your last dose was: Your next dose is: Take 2.5 mg by mouth two (2) times a day. Indications: Patient takes 2.5mg (half pill) PO twice daily. splits up dose due to cough 2.5 mg Where to Get Your Medications Information on where to get these meds will be given to you by the nurse or doctor. ! Ask your nurse or doctor about these medications  
  traMADol 50 mg tablet Discharge Instructions Cholecystectomy: What to Expect at [facility] Your Recovery After your surgery, it is normal to feel weak and tired for several days after you return home. Your belly may be swollen. If you had laparoscopic surgery, you may also have pain in your shoulder for about 24 hours. You may have gas or need to burp a lot at first, and a few people get diarrhea. The diarrhea usually goes away in 2 to 4 weeks, but it may last longer. How quickly you recover depends on whether you had a laparoscopic or open surgery. · For a laparoscopic surgery, most people can go back to work or their normal routine in 1 to 2 weeks, but it may take longer, depending on the type of work you do. · For an open surgery, it will probably take 4 to 6 weeks before you get back to your normal routine. This care sheet gives you a general idea about how long it will take for you to recover. However, each person recovers at a different pace. Follow the steps below to get better as quickly as possible. How can you care for yourself at home? Activity · Rest when you feel tired. Getting enough sleep will help you recover. · Try to walk each day. Start out by walking a little more than you did the day before. Gradually increase the amount you walk. Walking boosts blood flow and helps prevent pneumonia and constipation.  
· For about 2 to 4 weeks, avoid lifting anything that would make you strain. This may include a child, heavy grocery bags and milk containers, a heavy briefcase or backpack, cat litter or dog food bags, or a vacuum . · Avoid strenuous activities, such as biking, jogging, weightlifting, and aerobic exercise, until your doctor says it is okay. · You may shower 24 to 48 hours after surgery, if your doctor okays it. Pat the cut (incision) dry. Do not take a bath for the first 2 weeks, or until your doctor tells you it is okay. · You may drive when you are no longer taking pain medicine and can quickly move your foot from the gas pedal to the brake. You must also be able to sit comfortably for a long period of time, even if you do not plan to go far. You might get caught in traffic. · For a laparoscopic surgery, most people can go back to work or their normal routine in 1 to 2 weeks, but it may take longer. For an open surgery, it will probably take 4 to 6 weeks before you get back to your normal routine. · Your doctor will tell you when you can have sex again. Diet · Eat smaller meals more often instead of fewer larger meals. You can eat a normal diet, but avoid eating fatty foods for about 1 month. Fatty foods include hamburger, whole milk, cheese, and many snack foods. If your stomach is upset, try bland, low-fat foods like plain rice, broiled chicken, toast, and yogurt. · Drink plenty of fluids (unless your doctor tells you not to). · If you have diarrhea, try avoiding spicy foods, dairy products, fatty foods, and alcohol. You can also watch to see if specific foods cause it, and stop eating them. If the diarrhea continues for more than 2 weeks, talk to your doctor. · You may notice that your bowel movements are not regular right after your surgery. This is common. Try to avoid constipation and straining with bowel movements. You may want to take a fiber supplement every day.  If you have not had a bowel movement after a couple of days, ask your doctor about taking a mild laxative. Medicines · Take pain medicines exactly as directed. ¨ If the doctor gave you a prescription medicine for pain, take it as prescribed. ¨ If you are not taking a prescription pain medicine, take an over-the-counter medicine such as acetaminophen (Tylenol), ibuprofen (Advil, Motrin), or naproxen (Aleve). Read and follow all instructions on the label. ¨ Do not take two or more pain medicines at the same time unless the doctor told you to. Many pain medicines contain acetaminophen, which is Tylenol. Too much Tylenol can be harmful. · If you think your pain medicine is making you sick to your stomach: 
¨ Take your medicine after meals (unless your doctor tells you not to). ¨ Ask your doctor for a different pain medicine. · If your doctor prescribed antibiotics, take them as directed. Do not stop taking them just because you feel better. You need to take the full course of antibiotics. Incision care · If you have strips of tape on the incision, or cut, leave the tape on for a week or until it falls off. · After 24 to 48 hours, wash the area daily with warm, soapy water, and pat it dry. · You may have staples to hold the cut together. Keep them dry until your doctor takes them out. This is usually in 7 to 10 days. · Keep the area clean and dry. You may cover it with a gauze bandage if it weeps or rubs against clothing. Change the bandage every day. Ice · To reduce swelling and pain, put ice or a cold pack on your belly for 10 to 20 minutes at a time. Do this every 1 to 2 hours. Put a thin cloth between the ice and your skin. Follow-up care is a key part of your treatment and safety. Be sure to make and go to all appointments, and call your doctor if you are having problems. Its also a good idea to know your test results and keep a list of the medicines you take. When should you call for help? Call 911 anytime you think you may need emergency care. For example, call if: · You passed out (lost consciousness). · You have severe trouble breathing. · You have sudden chest pain and shortness of breath, or you cough up blood. Call your doctor now or seek immediate medical care if: 
· You are sick to your stomach and cannot drink fluids. · You have pain that does not get better when you take your pain medicine. · You have signs of infection, such as: 
¨ Increased pain, swelling, warmth, or redness. ¨ Red streaks leading from the incision. ¨ Pus draining from the incision. ¨ Swollen lymph nodes in your neck, armpits, or groin. ¨ A fever. · Your urine turns dark brown or your stool is light-colored or veronika-colored. · Your skin or the whites of your eyes turn yellow. · Bright red blood has soaked through a large bandage over your incision. · You have signs of a blood clot, such as: 
¨ Pain in your calf, back of knee, thigh, or groin. ¨ Redness and swelling in your leg or groin. · You have trouble passing urine or stool, especially if you have mild pain or swelling in your lower belly. Watch closely for any changes in your health, and be sure to contact your doctor if: 
· You had a laparoscopic surgery and your shoulder pain lasts more than 24 hours. · You do not have a bowel movement after taking a laxative. Where can you learn more? Go to [employer] Enter [phone] in the search box to learn more about \"Cholecystectomy: What to Expect at Home. \"  
© 4063-0174 [employer]wise, [employer]. Care instructions adapted under license by [employer] (which disclaims liability or warranty for this information). This care instruction is for use with your licensed healthcare professional. If you have questions about a medical condition or this instruction, always ask your healthcare professional. [place] [address] any warranty or liability for your use of this information. Content Version: 20.1.892979; Current as of: November 14, 2014 [employer] Announcement We are excited to announce that we are making your provider's discharge notes available to you in Lamie[employer]. You will see these notes when they are completed and signed by the physician that discharged you from your recent hospital stay. If you have any questions or concerns about any information you see in Lamie[employer], please call the Health Information Department where you were seen or reach out to your Primary Care Provider for more information about your plan of care. Introducing [facility]! Emmanuel López introduces [employer] patient portal. Now you can access parts of your medical record, email your doctor's office, and request medication refills online. 1. In your internet browser, go to [email or web address] 2. Click on the First Time User? Click Here link in the Sign In box. You will see the New Member Sign Up page. 3. Enter your [employer] Access Code exactly as it appears below. You will not need to use this code after youve completed the sign-up process. If you do not sign up before the expiration date, you must request a new code. · [employer] Access Code: [email or web address] Expires: 6/4/2018  9:47 AM 
 
4. Enter the last four digits of your Social Security Number (xxxx) and Date of Birth (mm/dd/yyyy) as indicated and click Submit. You will be taken to the next sign-up page. 5. Create a [employer]cco ID. This will be your [employer] login ID and cannot be changed, so think of one that is secure and easy to remember. 6. Create a [employer] password. You can change your password at any time. 7. Enter your Password Reset Question and Answer. This can be used at a later time if you forget your password. 8. Enter your e-mail address. You will receive e-mail notification when new information is available in [address] 9. Click Sign Up. You can now view and download portions of your medical record. 10. Click the Download Summary menu link to download a portable copy of your medical information. If you have questions, please visit the Frequently Asked Questions section of the Portable [employer] website. Remember, [employer] is NOT to be used for urgent needs. For medical emergencies, dial 911. Now available from your iPhone and Android! Providers Seen During Your Hospitalization Provider Specialty Primary office phone Masood Walsh,  Emergency Medicine [phone] Colie Spatz, MD General Surgery [phone] Your Primary Care Physician (PCP) Primary Care Physician Office Phone Office Fax Tamica Galvin [phone] You are allergic to the following Allergen Reactions Acetaminophen Unknown (comments) Oxycodone Nausea and Vomiting Recent Documentation Height Weight BMI OB Status Smoking Status 1.626 m 74.7 kg 28.27 kg/m2 Postmenopausal Never Smoker Emergency Contacts Name Discharge Info Relation Home Work Mobile Carolyn Bettencourt N/A  AT THIS TIME [6] Daughter [21] [phone] Patient Belongings The following personal items are in your possession at time of discharge: 
  Dental Appliances: None  Visual Aid: None             Clothing:  (clothing under stretcher, purse and nancy in pharmacy) Please provide this summary of care documentation to your next provider. Signatures-by signing, you are acknowledging that this After Visit Summary has been reviewed with you and you have received a copy. Patient Signature:  ____________________________________________________________ Date:  ____________________________________________________________  
  
Phyliss Ghee Provider Signature:  ____________________________________________________________ Date:  ____________________________________________________________

## 2018-03-05 NOTE — PERIOP NOTES
TRANSFER - IN REPORT:    Verbal report received from [facility], [address] on [address]  being received from ER for ordered procedure      Report consisted of patients Situation, Background, Assessment and   Recommendations(SBAR). Information from the following report(s) SBAR was reviewed with the receiving nurse. Opportunity for questions and clarification was provided. Assessment completed upon patients arrival to unit and care assumed.
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
## 2018-03-06 VITALS
HEART RATE: 62 BPM | BODY MASS INDEX: 28.12 KG/M2 | DIASTOLIC BLOOD PRESSURE: 51 MMHG | TEMPERATURE: 98.4 F | RESPIRATION RATE: 18 BRPM | SYSTOLIC BLOOD PRESSURE: 104 MMHG | OXYGEN SATURATION: 99 % | HEIGHT: 64 IN | WEIGHT: 164.68 LBS

## 2018-03-06 PROCEDURE — 74011250636 HC RX REV CODE- 250/636: Performed by: SURGERY

## 2018-03-06 PROCEDURE — 99218 HC RM OBSERVATION: CPT

## 2018-03-06 RX ORDER — TRAMADOL HYDROCHLORIDE 50 MG/1
50 TABLET ORAL
Qty: 30 TAB | Refills: 0 | Status: SHIPPED | OUTPATIENT
Start: 2018-03-06

## 2018-03-06 RX ADMIN — HYDROMORPHONE HYDROCHLORIDE 0.5 MG: 1 INJECTION, SOLUTION INTRAMUSCULAR; INTRAVENOUS; SUBCUTANEOUS at 05:05

## 2018-03-06 RX ADMIN — HYDROMORPHONE HYDROCHLORIDE 0.5 MG: 1 INJECTION, SOLUTION INTRAMUSCULAR; INTRAVENOUS; SUBCUTANEOUS at 09:43

## 2018-03-06 NOTE — ROUTINE PROCESS
General Surgery End of Shift Nursing Note    Bedside shift change report given to Haleigh Gray (oncoming nurse) by Martita Hartley (offgoing nurse). Report included the following information SBAR, Kardex, Intake/Output and MAR. Shift worked:   7a-7p   Summary of shift:   ambulated in hallway, voided, tolerating   Issues for physician to address:   ***     Number times ambulated in hallway past shift: {NUMBERS 0-5 [[id number]]}    Number of times OOB to chair past shift: {NUMBERS 0-5 [[id number]]}    Pain Management:  Current medication: ***  Patient states pain is manageable on current pain medication: {YES/NO:71244}    GI:    Current diet:  DIET CLEAR LIQUID    Tolerating current diet: {YES/NO:06194}  Passing flatus: {YES/NO:06530}  Last Bowel Movement: {Time; today/yest/etc:28557}   Appearance: ***    Respiratory:    Incentive Spirometer at bedside: {YES/NO:22449}  Patient instructed on use: {YES/NO:67965}    Patient Safety:    Falls Score: {Numbers; 1-4 :23989050}  Bed Alarm On? {YES/NO:45750}  Sitter?  {YES/NO:32756}    Cidny Croft

## 2018-03-06 NOTE — DISCHARGE SUMMARY
Physician Discharge Summary     Patient ID:  Angelica Matamoros  [id number]  42 y.o.  1940    Admit Date: 3/5/2018    Discharge Date: 3/6/2018    Admission Diagnoses: Cholecystitis;S/P cholecystectomy; Acute calculous cholecyst*    Discharge Diagnoses:  Principal Problem:    Acute calculous cholecystitis (3/5/2018)    Active Problems:    S/P cholecystectomy (3/5/2018)         Admission Condition: Fair    Discharge Condition: Good    Last Procedure: Procedure(s):  [facility] Course:   Normal hospital course for this procedure. Consults: None    Disposition: home    Patient Instructions:   Current Discharge Medication List      START taking these medications    Details   traMADol (ULTRAM) 50 mg tablet Take 1 Tab by mouth every six (6) hours as needed for Pain. Max Daily Amount: 200 mg. Qty: 30 Tab, Refills: 0    Associated Diagnoses: Acute calculous cholecystitis         CONTINUE these medications which have NOT CHANGED    Details   ALPRAZolam (XANAX) 1 mg tablet Take 1 mg by mouth two (2) times daily as needed for Anxiety or Sleep (For Nighttime dose: patient sometimes takes 0.5mg for sleep and an additional 0.5mg if she awakes during overnight. ).      ibuprofen (ADVIL) 200 mg tablet Take 200-400 mg by mouth every eight (8) hours as needed for Pain. nebivolol (BYSTOLIC) 5 mg tablet Take 2.5 mg by mouth two (2) times a day. Indications: Patient takes 2.5mg (half pill) PO twice daily. splits up dose due to cough           Activity: No driving while on analgesics and No heavy lifting for 4 weeks  Diet: Low fat, Low cholesterol  Wound Care: Keep wound clean and dry    Follow-up with Dr. Mayela Sinha in 2 weeks.   Follow-up tests/labs none    Signed:  Antonio Mcfadden MD  [facility]  3/6/2018  8:41 AM

## 2018-03-06 NOTE — DISCHARGE INSTRUCTIONS
Cholecystectomy: What to Expect at [address] [place] [address]  After your surgery, it is normal to feel weak and tired for several days after you return home. Your belly may be swollen. If you had laparoscopic surgery, you may also have pain in your shoulder for about 24 hours. You may have gas or need to burp a lot at first, and a few people get diarrhea. The diarrhea usually goes away in 2 to 4 weeks, but it may last longer. How quickly you recover depends on whether you had a laparoscopic or open surgery. · For a laparoscopic surgery, most people can go back to work or their normal routine in 1 to 2 weeks, but it may take longer, depending on the type of work you do. · For an open surgery, it will probably take 4 to 6 weeks before you get back to your normal routine. This care sheet gives you a general idea about how long it will take for you to recover. However, each person recovers at a different pace. Follow the steps below to get better as quickly as possible. How can you care for yourself at home? Activity  · Rest when you feel tired. Getting enough sleep will help you recover. · Try to walk each day. Start out by walking a little more than you did the day before. Gradually increase the amount you walk. Walking boosts blood flow and helps prevent pneumonia and constipation. · For about 2 to 4 weeks, avoid lifting anything that would make you strain. This may include a child, heavy grocery bags and milk containers, a heavy briefcase or backpack, cat litter or dog food bags, or a vacuum . · Avoid strenuous activities, such as biking, jogging, weightlifting, and aerobic exercise, until your doctor says it is okay. · You may shower 24 to 48 hours after surgery, if your doctor okays it. Pat the cut (incision) dry. Do not take a bath for the first 2 weeks, or until your doctor tells you it is okay.   · You may drive when you are no longer taking pain medicine and can quickly move your foot from the gas pedal to the brake. You must also be able to sit comfortably for a long period of time, even if you do not plan to go far. You might get caught in traffic. · For a laparoscopic surgery, most people can go back to work or their normal routine in 1 to 2 weeks, but it may take longer. For an open surgery, it will probably take 4 to 6 weeks before you get back to your normal routine. · Your doctor will tell you when you can have sex again. Diet  · Eat smaller meals more often instead of fewer larger meals. You can eat a normal diet, but avoid eating fatty foods for about 1 month. Fatty foods include hamburger, whole milk, cheese, and many snack foods. If your stomach is upset, try bland, low-fat foods like plain rice, broiled chicken, toast, and yogurt. · Drink plenty of fluids (unless your doctor tells you not to). · If you have diarrhea, try avoiding spicy foods, dairy products, fatty foods, and alcohol. You can also watch to see if specific foods cause it, and stop eating them. If the diarrhea continues for more than 2 weeks, talk to your doctor. · You may notice that your bowel movements are not regular right after your surgery. This is common. Try to avoid constipation and straining with bowel movements. You may want to take a fiber supplement every day. If you have not had a bowel movement after a couple of days, ask your doctor about taking a mild laxative. Medicines  · Take pain medicines exactly as directed. ¨ If the doctor gave you a prescription medicine for pain, take it as prescribed. ¨ If you are not taking a prescription pain medicine, take an over-the-counter medicine such as acetaminophen (Tylenol), ibuprofen (Advil, Motrin), or naproxen (Aleve). Read and follow all instructions on the label. ¨ Do not take two or more pain medicines at the same time unless the doctor told you to. Many pain medicines contain acetaminophen, which is Tylenol. Too much Tylenol can be harmful.   · If you think your pain medicine is making you sick to your stomach:  ¨ Take your medicine after meals (unless your doctor tells you not to). ¨ Ask your doctor for a different pain medicine. · If your doctor prescribed antibiotics, take them as directed. Do not stop taking them just because you feel better. You need to take the full course of antibiotics. Incision care  · If you have strips of tape on the incision, or cut, leave the tape on for a week or until it falls off. · After 24 to 48 hours, wash the area daily with warm, soapy water, and pat it dry. · You may have staples to hold the cut together. Keep them dry until your doctor takes them out. This is usually in 7 to 10 days. · Keep the area clean and dry. You may cover it with a gauze bandage if it weeps or rubs against clothing. Change the bandage every day. Ice   · To reduce swelling and pain, put ice or a cold pack on your belly for 10 to 20 minutes at a time. Do this every 1 to 2 hours. Put a thin cloth between the ice and your skin. Follow-up care is a key part of your treatment and safety. Be sure to make and go to all appointments, and call your doctor if you are having problems. Its also a good idea to know your test results and keep a list of the medicines you take. When should you call for help? Call 911 anytime you think you may need emergency care. For example, call if:  · You passed out (lost consciousness). · You have severe trouble breathing. · You have sudden chest pain and shortness of breath, or you cough up blood. Call your doctor now or seek immediate medical care if:  · You are sick to your stomach and cannot drink fluids. · You have pain that does not get better when you take your pain medicine. · You have signs of infection, such as:  ¨ Increased pain, swelling, warmth, or redness. ¨ Red streaks leading from the incision. ¨ Pus draining from the incision. ¨ Swollen lymph nodes in your neck, armpits, or groin. ¨ A fever.   · Your urine turns dark brown or your stool is light-colored or veronika-colored. · Your skin or the whites of your eyes turn yellow. · Bright red blood has soaked through a large bandage over your incision. · You have signs of a blood clot, such as:  ¨ Pain in your calf, back of knee, thigh, or groin. ¨ Redness and swelling in your leg or groin. · You have trouble passing urine or stool, especially if you have mild pain or swelling in your lower belly. Watch closely for any changes in your health, and be sure to contact your doctor if:  · You had a laparoscopic surgery and your shoulder pain lasts more than 24 hours. · You do not have a bowel movement after taking a laxative. Where can you learn more? Go to [employer]  Enter F357 in the search box to learn more about \"Cholecystectomy: What to Expect at Home. \"   © 3473-6570 Healthwise, Incorporated. Care instructions adapted under license by [facility] [employer] (which disclaims liability or warranty for this information). This care instruction is for use with your licensed healthcare professional. If you have questions about a medical condition or this instruction, always ask your healthcare professional. [place] [address] any warranty or liability for your use of this information.   Content Version: 81.1.022597; Current as of: November 14, 2014

## 2018-03-23 ENCOUNTER — OFFICE VISIT (OUTPATIENT)
Dept: SURGERY | Age: 78
End: 2018-03-23

## 2018-03-23 VITALS
DIASTOLIC BLOOD PRESSURE: 83 MMHG | SYSTOLIC BLOOD PRESSURE: 135 MMHG | TEMPERATURE: 97.7 F | OXYGEN SATURATION: 95 % | BODY MASS INDEX: 27.08 KG/M2 | HEART RATE: 66 BPM | WEIGHT: 158.6 LBS | RESPIRATION RATE: 16 BRPM | HEIGHT: 64 IN

## 2018-03-23 DIAGNOSIS — K80.00 ACUTE CALCULOUS CHOLECYSTITIS: Primary | ICD-10-CM

## 2018-03-23 NOTE — PROGRESS NOTES
Chief Complaint   Patient presents with    Post OP Follow Up       1. Have you been to the ER, urgent care clinic since your last visit? Hospitalized since your last visit? no    2. Have you seen or consulted any other health care providers outside of the [address] [place] [address] since your last visit? Include any pap smears or colon screening.   no

## 2018-03-23 NOTE — PROGRESS NOTES
SUBJECTIVE: Jad Walker is a 69 y.o. female is seen for a routine postop check s/p lap cholecstectomy. Reports no problems with the wound or other issues. Activity, diet and bowels are normal. No pain. OBJECTIVE: Appears well. Wounds are well healed without complications or infection. ASSESSMENT: normal postoperative course, doing well. PLAN: Patient is instructed to avoid heavy lifting for 2 more weeks. Return PRN for any problems or concerns.

## 2020-02-14 ENCOUNTER — HOSPITAL ENCOUNTER (OUTPATIENT)
Dept: MRI IMAGING | Age: 80
Discharge: HOME | End: 2020-02-14
Attending: PHYSICAL MEDICINE & REHABILITATION
Payer: MEDICARE

## 2020-02-14 DIAGNOSIS — S22.080A CLOSED WEDGE COMPRESSION FRACTURE OF T12 VERTEBRA (HCC): ICD-10-CM

## 2020-02-14 PROCEDURE — 72146 MRI CHEST SPINE W/O DYE: CPT

## 2022-10-20 ENCOUNTER — APPOINTMENT (RX ONLY)
Dept: URBAN - METROPOLITAN AREA CLINIC 82 | Facility: CLINIC | Age: 82
Setting detail: DERMATOLOGY
End: 2022-10-20

## 2022-10-20 DIAGNOSIS — L57.0 ACTINIC KERATOSIS: ICD-10-CM

## 2022-10-20 DIAGNOSIS — Z41.9 ENCOUNTER FOR PROCEDURE FOR PURPOSES OTHER THAN REMEDYING HEALTH STATE, UNSPECIFIED: ICD-10-CM

## 2022-10-20 PROCEDURE — ? JUVEDERM VOLLURE XC INJECTION

## 2022-10-20 PROCEDURE — ? LIQUID NITROGEN

## 2022-10-20 PROCEDURE — ? COSMETIC QUOTE

## 2022-10-20 PROCEDURE — ? ADDITIONAL NOTES

## 2022-10-20 PROCEDURE — 17000 DESTRUCT PREMALG LESION: CPT

## 2022-10-20 PROCEDURE — ? COUNSELING

## 2022-10-20 ASSESSMENT — LOCATION ZONE DERM
LOCATION ZONE: FACE
LOCATION ZONE: FACE

## 2022-10-20 ASSESSMENT — LOCATION DETAILED DESCRIPTION DERM
LOCATION DETAILED: LEFT CENTRAL BUCCAL CHEEK
LOCATION DETAILED: LEFT CENTRAL MALAR CHEEK

## 2022-10-20 ASSESSMENT — LOCATION SIMPLE DESCRIPTION DERM
LOCATION SIMPLE: LEFT CHEEK
LOCATION SIMPLE: LEFT CHEEK

## 2022-10-20 NOTE — PROCEDURE: ADDITIONAL NOTES
Detail Level: Simple
Render Risk Assessment In Note?: no
Additional Notes: bx proven ak . Prior path in [facility].

## 2022-10-20 NOTE — HPI: SKIN LESION (ACTINIC KERATOSES)
Is This Lesion Biopsy Proven?: No
Is This A New Presentation, Or A Follow-Up?: Actinic Keratosis
Additional History: Patient is present today for a follow up visit to to treat AK at left cheek that was deferred at previous visit.

## 2022-10-20 NOTE — PROCEDURE: COSMETIC QUOTE
Misc Procedure 5 Price/Unit (In Dollars- Use Only Numbers And Decimals): 0.00
Laser 8 Percentage Discount: 0
Include Sales Tax On Surgeon's Fees: Yes
Injectable  11: vollure
Injectable  11 Units: 2
Include Sales Tax On Facility Fees: No
Injectable 11 Free Text Discount (In Dollars- Use Only Numbers And Decimals): Ashley Evans
Injectable 1: voluma
Facility Fee Units (Optional): 1
Injectable  11 Price/Unit (In Dollars- Use Only Numbers And Decimals): 700
Detail Level: Zone
Injectable 1 Price/Unit (In Dollars- Use Only Numbers And Decimals): [address]

## 2022-10-20 NOTE — PROCEDURE: JUVEDERM VOLLURE XC INJECTION
[place] Filler  Volume In Cc: 0
Use Map Statement For Sites (Optional): No
Number Of Syringes (Required For Inventory): 1
Show Inventory Tab: Show
Map Statment: See [address] for Complete Details
Consent: Written consent obtained. Risks include but not limited to bruising, beading, irregular texture, ulceration, infection, allergic reaction, scar formation, incomplete augmentation, temporary nature, procedural pain.
Detail Level: Zone
Filler: Juvederm Vollure XC
Procedural Text: The filler was administered to the treatment areas noted above.
Post-Care Instructions: Patient instructed to apply ice to reduce swelling.
Anesthesia Type: 0.3% lidocaine (mixed within filler)

## 2022-10-20 NOTE — HPI: FOLLOW UP FILLERS
Have You Had Filler Before?: has had prior [employer]
When Was Your Last Filler Injection?: 10/06/2022

## 2022-10-20 NOTE — PROCEDURE: LIQUID NITROGEN
You can access the FollowMyHealth Patient Portal offered by [facility] by registering at the following website: [email or web address]. By joining [employer]’s FollowMyHealth portal, you will also be able to view your health information using other applications (apps) compatible with our system. Duration Of Freeze Thaw-Cycle (Seconds): 10

## 2023-01-12 ENCOUNTER — APPOINTMENT (RX ONLY)
Dept: URBAN - METROPOLITAN AREA CLINIC 82 | Facility: CLINIC | Age: 83
Setting detail: DERMATOLOGY
End: 2023-01-12

## 2023-01-12 DIAGNOSIS — L98.8 OTHER SPECIFIED DISORDERS OF THE SKIN AND SUBCUTANEOUS TISSUE: ICD-10-CM

## 2023-01-12 DIAGNOSIS — Z41.9 ENCOUNTER FOR PROCEDURE FOR PURPOSES OTHER THAN REMEDYING HEALTH STATE, UNSPECIFIED: ICD-10-CM

## 2023-01-12 PROCEDURE — ? FILLERS

## 2023-01-12 PROCEDURE — ? INVENTORY

## 2023-01-12 PROCEDURE — ? ADDITIONAL NOTES

## 2023-01-12 NOTE — PROCEDURE: ADDITIONAL NOTES
Render Risk Assessment In Note?: no
Additional Notes: Patient had prior appointment scheduled in November to take advantage of the Buy 3 get 1 free filler special. Patient unfortunately got COVID so could not come in and I promised to uphold the special she missed. \\nBuy 3 get 1 free filler syringe
Detail Level: Zone
Additional Notes: Refer to Kiana Acevedo for C02 laser consult.

## 2023-01-12 NOTE — PROCEDURE: FILLERS
Additional Area 2 Volume In Cc: 0
Include Cannula Information In Note?: No
Map Statment: See [address] for Complete Details
Include Documentation That Aspiration Was Performed Prior To Injecting Filler:: Yes
Vermilion Lips Filler Volume In Cc: 1.2
Topical Anesthesia?: BLT cream (benzocaine 30%, lidocaine 12%, tetracaine 6%)
Filler: Versa+
Lot #: [id number]
Aspiration Statement: Aspiration was performed prior to injecting site with filler.
Lot #: [id number]
Filler: Juvederm Voluma XC
Consent: Written consent obtained. Risks include but not limited to bruising, beading, irregular texture, ulceration, infection, allergic reaction, scar formation, incomplete augmentation, temporary nature, and procedural pain.
Expiration Date (Month Year): 09/07/2024
Detail Level: Detailed
Post-Care Instructions: After the procedure, patient instructed to apply ice to reduce swelling.
Expiration Date (Month Year): 10/25/2023
Anesthesia Type: 0.2% lidocaine (mixed within filler)
Cheeks Filler Volume In Cc: 3

## 2023-01-26 ENCOUNTER — APPOINTMENT (RX ONLY)
Dept: URBAN - METROPOLITAN AREA CLINIC 82 | Facility: CLINIC | Age: 83
Setting detail: DERMATOLOGY
End: 2023-01-26

## 2023-01-26 DIAGNOSIS — Z41.9 ENCOUNTER FOR PROCEDURE FOR PURPOSES OTHER THAN REMEDYING HEALTH STATE, UNSPECIFIED: ICD-10-CM

## 2023-01-26 PROCEDURE — ? EDUCATIONAL RESOURCES PROVIDED

## 2023-01-26 PROCEDURE — ? ADDITIONAL NOTES

## 2023-01-26 ASSESSMENT — LOCATION SIMPLE DESCRIPTION DERM
LOCATION SIMPLE: LEFT FOREHEAD
LOCATION SIMPLE: LEFT CHEEK
LOCATION SIMPLE: RIGHT CHEEK

## 2023-01-26 ASSESSMENT — LOCATION DETAILED DESCRIPTION DERM
LOCATION DETAILED: LEFT CENTRAL MALAR CHEEK
LOCATION DETAILED: RIGHT INFERIOR CENTRAL MALAR CHEEK
LOCATION DETAILED: LEFT INFERIOR FOREHEAD

## 2023-01-26 ASSESSMENT — LOCATION ZONE DERM: LOCATION ZONE: FACE

## 2023-01-26 NOTE — PROCEDURE: ADDITIONAL NOTES
Render Risk Assessment In Note?: no
Additional Notes: Patient pleased with filler results. Interested in a VI peel and also recommended [employer] Trio system.
Detail Level: Zone

## 2023-02-23 ENCOUNTER — APPOINTMENT (RX ONLY)
Dept: URBAN - METROPOLITAN AREA CLINIC 82 | Facility: CLINIC | Age: 83
Setting detail: DERMATOLOGY
End: 2023-02-23

## 2023-02-23 DIAGNOSIS — Z41.9 ENCOUNTER FOR PROCEDURE FOR PURPOSES OTHER THAN REMEDYING HEALTH STATE, UNSPECIFIED: ICD-10-CM

## 2023-02-23 PROCEDURE — ? BOTOX

## 2023-02-23 PROCEDURE — ? VI PEEL

## 2023-02-23 ASSESSMENT — LOCATION SIMPLE DESCRIPTION DERM
LOCATION SIMPLE: LEFT CHEEK
LOCATION SIMPLE: INFERIOR FOREHEAD
LOCATION SIMPLE: RIGHT CHEEK
LOCATION SIMPLE: RIGHT CHEEK
LOCATION SIMPLE: RIGHT FOREHEAD

## 2023-02-23 ASSESSMENT — LOCATION ZONE DERM
LOCATION ZONE: FACE
LOCATION ZONE: FACE

## 2023-02-23 ASSESSMENT — LOCATION DETAILED DESCRIPTION DERM
LOCATION DETAILED: LEFT INFERIOR CENTRAL MALAR CHEEK
LOCATION DETAILED: INFERIOR MID FOREHEAD
LOCATION DETAILED: RIGHT SUPERIOR MEDIAL BUCCAL CHEEK
LOCATION DETAILED: RIGHT INFERIOR FOREHEAD
LOCATION DETAILED: RIGHT CENTRAL MALAR CHEEK

## 2023-02-23 NOTE — PROCEDURE: BOTOX
Show Right And Left Periorbital Units: No
Right Pupillary Line Units: 0
Post-Care Instructions: Patient instructed to not lie down for 4 hours and limit physical activity for 24 hours.
Dilution (U/0.1 Cc): 2
Show Topical Anesthesia: Yes
Comments: charged $11 a unit per provider
Incrementing Botox Units: By 0.5 Units
Detail Level: Detailed
Forehead Units: [address] [facility]
Additional Area 1 Location: [facility]
Glabellar Complex Units: 20
Show Inventory Tab: Show
Consent: Written consent obtained. Risks include but not limited to lid/brow ptosis, bruising, swelling, diplopia, temporary effect, incomplete chemical denervation.
Additional Area 1 Units: 24

## 2023-02-23 NOTE — PROCEDURE: VI PEEL
Treatment Number: 1
Prep: The treated area was degreased with pre-peel cleanser, and vaseline was applied for protection of mucous membranes.
Post Peel Care: After the procedure, the patient was instructed not to wash the treated area for 6-8 hours or manually remove dead skin when the peeling process starts. Patient may use OTC hydrocortisone cream for itching. Patient instructed to use the provided Retin-A wipes on the treated area on the 1st and 2nd nights.
Consent: Prior to the procedure, written consent was obtained and risks were reviewed, including but not limited to: redness, peeling, blistering, pigmentary change, scarring, infection, and pain. Patient is aware multiple treatments may be necessary to achieve the desired outcome.
Post-Care Instructions: I reviewed with the patient in detail post-care instructions. Patient should avoid sun exposure and wear sun protection.
Chemical Peel: VI Peel
Detail Level: Zone
Price (Use Numbers Only, No Special Characters Or $): 400.00

## 2023-03-16 ENCOUNTER — APPOINTMENT (RX ONLY)
Dept: URBAN - METROPOLITAN AREA CLINIC 82 | Facility: CLINIC | Age: 83
Setting detail: DERMATOLOGY
End: 2023-03-16

## 2023-03-16 DIAGNOSIS — Z41.9 ENCOUNTER FOR PROCEDURE FOR PURPOSES OTHER THAN REMEDYING HEALTH STATE, UNSPECIFIED: ICD-10-CM

## 2023-03-16 PROCEDURE — ? JUVEDERM VOLBELLA INJECTION

## 2023-03-16 PROCEDURE — ? RADIESSE INJECTION

## 2023-03-16 ASSESSMENT — LOCATION DETAILED DESCRIPTION DERM
LOCATION DETAILED: LEFT UPPER CUTANEOUS LIP
LOCATION DETAILED: RIGHT SUPERIOR LATERAL MALAR CHEEK
LOCATION DETAILED: LEFT INFERIOR CENTRAL MALAR CHEEK
LOCATION DETAILED: RIGHT LATERAL MALAR CHEEK
LOCATION DETAILED: RIGHT UPPER CUTANEOUS LIP
LOCATION DETAILED: LEFT CENTRAL MALAR CHEEK

## 2023-03-16 ASSESSMENT — LOCATION SIMPLE DESCRIPTION DERM
LOCATION SIMPLE: RIGHT CHEEK
LOCATION SIMPLE: RIGHT LIP
LOCATION SIMPLE: LEFT LIP
LOCATION SIMPLE: LEFT CHEEK
LOCATION SIMPLE: RIGHT CHEEK

## 2023-03-16 ASSESSMENT — LOCATION ZONE DERM
LOCATION ZONE: FACE
LOCATION ZONE: FACE
LOCATION ZONE: LIP
LOCATION ZONE: LIP

## 2023-03-16 NOTE — PROCEDURE: JUVEDERM VOLBELLA INJECTION
Nasolabial Folds Filler Volume In Cc: 0
Post-Care Instructions: Patient instructed to apply ice to reduce swelling.
Procedural Text: The filler was administered to the treatment areas noted above.
Anesthesia Type: 1% lidocaine with epinephrine
Consent: Written consent obtained. Risks include but not limited to bruising, beading, irregular texture, ulceration, infection, allergic reaction, scar formation, incomplete augmentation, temporary nature, procedural pain.
Additional Anesthesia Volume In Cc: 6
Use Map Statement For Sites (Optional): No
Number Of Syringes (Required For Inventory): 1
Show Inventory Tab: Show
Anesthesia Volume In Cc: 0.5
Map Statment: See [address] for Complete Details
Filler: Juvederm Volbella XC
Detail Level: Detailed

## 2023-03-16 NOTE — PROCEDURE: RADIESSE INJECTION
Cheeks Filler Volume In Cc: 1.5
Anesthesia Volume In Cc: 0.5
Tear Troughs Filler  Volume In Cc: 0
Map Statement: See Attached Map for Complete Details
Consent: Written consent obtained. Risks include but not limited to bruising, beading, irregular texture, ulceration, infection, allergic reaction, scar formation, incomplete augmentation, temporary nature, procedural pain.
Procedural Text: The filler was administered to the treatment areas noted above.
Filler: Radiesse
Post-Care Instructions: Patient instructed to apply ice to reduce swelling.
Detail Level: Detailed
Include Cannula Information In Note?: No
Anesthesia Type: 1% lidocaine with epinephrine
Additional Anesthesia Volume In Cc: 6
Number Of Syringes (Required For Inventory): 2
Show Inventory Tab: Show

## 2023-04-06 ENCOUNTER — APPOINTMENT (RX ONLY)
Dept: URBAN - METROPOLITAN AREA CLINIC 82 | Facility: CLINIC | Age: 83
Setting detail: DERMATOLOGY
End: 2023-04-06

## 2023-04-06 DIAGNOSIS — Z41.9 ENCOUNTER FOR PROCEDURE FOR PURPOSES OTHER THAN REMEDYING HEALTH STATE, UNSPECIFIED: ICD-10-CM

## 2023-04-06 PROCEDURE — ? BOTOX

## 2023-04-06 ASSESSMENT — LOCATION DETAILED DESCRIPTION DERM
LOCATION DETAILED: LEFT LATERAL EYEBROW
LOCATION DETAILED: LEFT SUPERIOR LATERAL MALAR CHEEK
LOCATION DETAILED: LEFT INFERIOR TEMPLE
LOCATION DETAILED: RIGHT MID TEMPLE
LOCATION DETAILED: LEFT MID TEMPLE
LOCATION DETAILED: RIGHT INFERIOR TEMPLE
LOCATION DETAILED: RIGHT SUPERIOR LATERAL MALAR CHEEK

## 2023-04-06 ASSESSMENT — LOCATION SIMPLE DESCRIPTION DERM
LOCATION SIMPLE: LEFT CHEEK
LOCATION SIMPLE: RIGHT TEMPLE
LOCATION SIMPLE: RIGHT CHEEK
LOCATION SIMPLE: LEFT TEMPLE
LOCATION SIMPLE: LEFT EYEBROW

## 2023-04-06 ASSESSMENT — LOCATION ZONE DERM: LOCATION ZONE: FACE

## 2023-04-06 NOTE — PROCEDURE: BOTOX
Show Right And Left Brow Units: No
Glabellar Complex Units: 0
Show Nasal Units: Yes
Consent: Written consent obtained. Risks include but not limited to lid/brow ptosis, bruising, swelling, diplopia, temporary effect, incomplete chemical denervation.
Additional Area 1 Location: [facility]
Incrementing Botox Units: By 0.5 Units
Dilution (U/0.1 Cc): 2
Detail Level: Detailed
Additional Area 1 Units: 24
Post-Care Instructions: Patient instructed to not lie down for 4 hours and limit physical activity for 24 hours.
Show Inventory Tab: Show

## 2023-04-20 ENCOUNTER — APPOINTMENT (RX ONLY)
Dept: URBAN - METROPOLITAN AREA CLINIC 82 | Facility: CLINIC | Age: 83
Setting detail: DERMATOLOGY
End: 2023-04-20

## 2023-04-20 DIAGNOSIS — Z41.9 ENCOUNTER FOR PROCEDURE FOR PURPOSES OTHER THAN REMEDYING HEALTH STATE, UNSPECIFIED: ICD-10-CM

## 2023-04-20 PROCEDURE — ? COSMETIC FOLLOW-UP

## 2023-04-20 PROCEDURE — ? FILLERS

## 2023-04-20 ASSESSMENT — LOCATION DETAILED DESCRIPTION DERM
LOCATION DETAILED: LEFT SUPERIOR VERMILION LIP
LOCATION DETAILED: RIGHT UPPER CUTANEOUS LIP
LOCATION DETAILED: LEFT UPPER CUTANEOUS LIP

## 2023-04-20 ASSESSMENT — LOCATION ZONE DERM: LOCATION ZONE: LIP

## 2023-04-20 ASSESSMENT — LOCATION SIMPLE DESCRIPTION DERM
LOCATION SIMPLE: LEFT LIP
LOCATION SIMPLE: RIGHT LIP

## 2023-04-20 NOTE — PROCEDURE: COSMETIC FOLLOW-UP
Detail Level: Zone
Side Effects Or Complications: None
Global Improvement: Good
Patient Satisfaction: Very pleased

## 2023-04-20 NOTE — PROCEDURE: FILLERS
Inventory Information: This plan will send filler information to inventory based on the fillers you select. Multiple fillers can be sent but you must ensure you select the appropriate fillers in the inventory tab.
Tear Troughs Filler  Volume In Cc: 0
Topical Anesthesia?: BLT cream (benzocaine 30%, lidocaine 12%, tetracaine 6%)
Include Cannula Information In Note?: No
Detail Level: Detailed
Consent: Written consent obtained. Risks include but not limited to bruising, beading, irregular texture, ulceration, infection, allergic reaction, scar formation, incomplete augmentation, temporary nature, procedural pain.
Post-Care Instructions: Patient instructed to apply ice to reduce swelling.
Show Inventory Tab: Show
Filler: Belotero
Number Of Syringes (Required For Inventory): 1
Map Statment: See [address] for Complete Details
Additional Anesthesia Volume In Cc: 6

## (undated) DEVICE — CONTINU-FLO SOLUTION SET, 2 INJECTION SITES, MALE LUER LOCK ADAPTER WITH RETRACTABLE COLLAR, LARGE BORE STOPCOCK WITH ROTATING MALE LUER LOCK EXTENSION SET, 2 INJECTION SITES, MALE LUER LOCK ADAPTER WITH RETRACTABLE COLLAR: Brand: INTERLINK/CONTINU-FLO

## (undated) DEVICE — DERMABOND SKIN ADH 0.7ML -- DERMABOND ADVANCED 12/BX

## (undated) DEVICE — CLIP APPLIER WITH CLIP LOGIC TECHNOLOGY: Brand: ENDO CLIP III

## (undated) DEVICE — SYR 10ML LUER LOK 1/5ML GRAD --

## (undated) DEVICE — GOWN,PREVENTION PLUS,XL,ST,24/CS: Brand: MEDLINE

## (undated) DEVICE — INTENDED FOR TISSUE SEPARATION, AND OTHER PROCEDURES THAT REQUIRE A SHARP SURGICAL BLADE TO PUNCTURE OR CUT.: Brand: BARD-PARKER ® CARBON RIB-BACK BLADES

## (undated) DEVICE — BLUNT TROCAR WITH THREADED ANCHOR: Brand: VERSAONE

## (undated) DEVICE — HANDLE LT SNAP ON ULT DURABLE LENS FOR TRUMPF ALC DISPOSABLE

## (undated) DEVICE — BLADELESS OPTICAL TROCAR WITH FIXATION CANNULA: Brand: VERSAPORT

## (undated) DEVICE — SUTURE MCRYL SZ 5 0 L18IN ABSRB UD PC 5 L19MM 1 2 CIR Y822G

## (undated) DEVICE — SUTURE SZ 0 27IN 5/8 CIR UR-6  TAPER PT VIOLET ABSRB VICRYL J603H

## (undated) DEVICE — REM POLYHESIVE ADULT PATIENT RETURN ELECTRODE: Brand: VALLEYLAB

## (undated) DEVICE — SPECIMEN RETRIEVAL POUCH: Brand: ENDO CATCH GOLD

## (undated) DEVICE — (D)PREP SKN CHLRAPRP APPL 26ML -- CONVERT TO ITEM 371833

## (undated) DEVICE — INFECTION CONTROL KIT SYS

## (undated) DEVICE — 3M™ TEGADERM™ TRANSPARENT FILM DRESSING FRAME STYLE, 1624W, 2-3/8 IN X 2-3/4 IN (6 CM X 7 CM), 100/CT 4CT/CASE: Brand: 3M™ TEGADERM™

## (undated) DEVICE — STERILE POLYISOPRENE POWDER-FREE SURGICAL GLOVES: Brand: PROTEXIS

## (undated) DEVICE — STERILE POLYISOPRENE POWDER-FREE SURGICAL GLOVES WITH EMOLLIENT COATING: Brand: PROTEXIS

## (undated) DEVICE — KENDALL DL ECG CABLE AND LEAD WIRE SYSTEM, 3-LEAD, SINGLE PATIENT USE: Brand: KENDALL

## (undated) DEVICE — SURGICAL PROCEDURE KIT GEN LAPAROSCOPY LF

## (undated) DEVICE — Device

## (undated) DEVICE — SOLUTION IV 1000ML 0.9% SOD CHL

## (undated) DEVICE — NEEDLE HYPO 25GA L1.5IN BVL ORIENTED ECLIPSE

## (undated) DEVICE — SOLUTION LACTATED RINGERS INJECTION USP

## (undated) DEVICE — CATHETER IV 20GA L1.25IN FEP STR HUB TEF INTROCAN SFTY

## (undated) DEVICE — 1200 GUARD II KIT W/5MM TUBE W/O VAC TUBE: Brand: GUARDIAN

## (undated) DEVICE — ELECTRODE ES 36CM LAP FLAT L HK COAT DISP CLEANCOAT

## (undated) DEVICE — UNIVERSAL FIXATION CANNULA: Brand: VERSAONE